# Patient Record
Sex: MALE | Race: WHITE | NOT HISPANIC OR LATINO | URBAN - METROPOLITAN AREA
[De-identification: names, ages, dates, MRNs, and addresses within clinical notes are randomized per-mention and may not be internally consistent; named-entity substitution may affect disease eponyms.]

---

## 2018-05-16 ENCOUNTER — EMERGENCY (EMERGENCY)
Facility: HOSPITAL | Age: 63
LOS: 1 days | Discharge: HOME | End: 2018-05-16
Attending: EMERGENCY MEDICINE | Admitting: EMERGENCY MEDICINE
Payer: COMMERCIAL

## 2018-05-16 VITALS
DIASTOLIC BLOOD PRESSURE: 73 MMHG | OXYGEN SATURATION: 97 % | HEART RATE: 85 BPM | RESPIRATION RATE: 16 BRPM | WEIGHT: 164.91 LBS | SYSTOLIC BLOOD PRESSURE: 111 MMHG | TEMPERATURE: 98 F

## 2018-05-16 VITALS
OXYGEN SATURATION: 98 % | SYSTOLIC BLOOD PRESSURE: 101 MMHG | DIASTOLIC BLOOD PRESSURE: 69 MMHG | TEMPERATURE: 98 F | RESPIRATION RATE: 16 BRPM

## 2018-05-16 DIAGNOSIS — R33.9 RETENTION OF URINE, UNSPECIFIED: ICD-10-CM

## 2018-05-16 DIAGNOSIS — R10.30 LOWER ABDOMINAL PAIN, UNSPECIFIED: ICD-10-CM

## 2018-05-16 LAB
ALBUMIN SERPL ELPH-MCNC: 4.2 G/DL — SIGNIFICANT CHANGE UP (ref 3.3–5)
ALP SERPL-CCNC: 75 U/L — SIGNIFICANT CHANGE UP (ref 40–120)
ALT FLD-CCNC: 18 U/L — SIGNIFICANT CHANGE UP (ref 10–45)
ANION GAP SERPL CALC-SCNC: 17 MMOL/L — SIGNIFICANT CHANGE UP (ref 5–17)
APPEARANCE UR: CLEAR — SIGNIFICANT CHANGE UP
AST SERPL-CCNC: 29 U/L — SIGNIFICANT CHANGE UP (ref 10–40)
BASOPHILS NFR BLD AUTO: 0.3 % — SIGNIFICANT CHANGE UP (ref 0–2)
BILIRUB SERPL-MCNC: 2.2 MG/DL — HIGH (ref 0.2–1.2)
BILIRUB UR-MCNC: NEGATIVE — SIGNIFICANT CHANGE UP
BUN SERPL-MCNC: 13 MG/DL — SIGNIFICANT CHANGE UP (ref 7–23)
CALCIUM SERPL-MCNC: 10.1 MG/DL — SIGNIFICANT CHANGE UP (ref 8.4–10.5)
CHLORIDE SERPL-SCNC: 104 MMOL/L — SIGNIFICANT CHANGE UP (ref 96–108)
CO2 SERPL-SCNC: 24 MMOL/L — SIGNIFICANT CHANGE UP (ref 22–31)
COLOR SPEC: YELLOW — SIGNIFICANT CHANGE UP
CREAT SERPL-MCNC: 1.05 MG/DL — SIGNIFICANT CHANGE UP (ref 0.5–1.3)
DIFF PNL FLD: (no result)
EOSINOPHIL NFR BLD AUTO: 2.2 % — SIGNIFICANT CHANGE UP (ref 0–6)
GLUCOSE SERPL-MCNC: 125 MG/DL — HIGH (ref 70–99)
GLUCOSE UR QL: NEGATIVE — SIGNIFICANT CHANGE UP
HCT VFR BLD CALC: 42.7 % — SIGNIFICANT CHANGE UP (ref 39–50)
HGB BLD-MCNC: 14 G/DL — SIGNIFICANT CHANGE UP (ref 13–17)
KETONES UR-MCNC: NEGATIVE — SIGNIFICANT CHANGE UP
LEUKOCYTE ESTERASE UR-ACNC: (no result)
LYMPHOCYTES # BLD AUTO: 28 % — SIGNIFICANT CHANGE UP (ref 13–44)
MCHC RBC-ENTMCNC: 28.5 PG — SIGNIFICANT CHANGE UP (ref 27–34)
MCHC RBC-ENTMCNC: 32.8 G/DL — SIGNIFICANT CHANGE UP (ref 32–36)
MCV RBC AUTO: 86.8 FL — SIGNIFICANT CHANGE UP (ref 80–100)
MONOCYTES NFR BLD AUTO: 9.2 % — SIGNIFICANT CHANGE UP (ref 2–14)
NEUTROPHILS NFR BLD AUTO: 60.3 % — SIGNIFICANT CHANGE UP (ref 43–77)
NITRITE UR-MCNC: NEGATIVE — SIGNIFICANT CHANGE UP
PH UR: 7 — SIGNIFICANT CHANGE UP (ref 5–8)
PLATELET # BLD AUTO: 247 K/UL — SIGNIFICANT CHANGE UP (ref 150–400)
POTASSIUM SERPL-MCNC: 4.5 MMOL/L — SIGNIFICANT CHANGE UP (ref 3.5–5.3)
POTASSIUM SERPL-SCNC: 4.5 MMOL/L — SIGNIFICANT CHANGE UP (ref 3.5–5.3)
PROT SERPL-MCNC: 7.1 G/DL — SIGNIFICANT CHANGE UP (ref 6–8.3)
PROT UR-MCNC: NEGATIVE MG/DL — SIGNIFICANT CHANGE UP
RBC # BLD: 4.92 M/UL — SIGNIFICANT CHANGE UP (ref 4.2–5.8)
RBC # FLD: 14.2 % — SIGNIFICANT CHANGE UP (ref 10.3–16.9)
SODIUM SERPL-SCNC: 145 MMOL/L — SIGNIFICANT CHANGE UP (ref 135–145)
SP GR SPEC: <=1.005 — SIGNIFICANT CHANGE UP (ref 1–1.03)
UROBILINOGEN FLD QL: 0.2 E.U./DL — SIGNIFICANT CHANGE UP
WBC # BLD: 6.7 K/UL — SIGNIFICANT CHANGE UP (ref 3.8–10.5)
WBC # FLD AUTO: 6.7 K/UL — SIGNIFICANT CHANGE UP (ref 3.8–10.5)

## 2018-05-16 PROCEDURE — 80053 COMPREHEN METABOLIC PANEL: CPT

## 2018-05-16 PROCEDURE — 81001 URINALYSIS AUTO W/SCOPE: CPT

## 2018-05-16 PROCEDURE — 87086 URINE CULTURE/COLONY COUNT: CPT

## 2018-05-16 PROCEDURE — 99284 EMERGENCY DEPT VISIT MOD MDM: CPT | Mod: 25

## 2018-05-16 PROCEDURE — 85025 COMPLETE CBC W/AUTO DIFF WBC: CPT

## 2018-05-16 PROCEDURE — 36415 COLL VENOUS BLD VENIPUNCTURE: CPT

## 2018-05-16 PROCEDURE — 51702 INSERT TEMP BLADDER CATH: CPT

## 2018-05-16 RX ORDER — LIDOCAINE HCL 20 MG/ML
5 VIAL (ML) INJECTION ONCE
Qty: 0 | Refills: 0 | Status: COMPLETED | OUTPATIENT
Start: 2018-05-16 | End: 2018-05-16

## 2018-05-16 RX ADMIN — Medication 5 MILLILITER(S): at 04:13

## 2018-05-16 NOTE — ED ADULT NURSE REASSESSMENT NOTE - NS ED NURSE REASSESS COMMENT FT1
Urology successfully placed 20F lozada cath drained 900ml clear yellow urine pt verbalized relief from pressure

## 2018-05-16 NOTE — ED PROVIDER NOTE - OBJECTIVE STATEMENT
61 y/o m hx BPH presents stating 3 days ago, had difficulty urinating, was seen by his urologist and had lozada placed.  Pt stating he was told to take it out last night and f/u with his urologist today for cystoscopy.  Pt stating after he removed it, there was blood coming out of urethra and since then he has been unable to urinate, having lower abdominal pain.  Denies fever, chills, all other ROS negative.

## 2018-05-16 NOTE — ED ADULT NURSE NOTE - CHIEF COMPLAINT QUOTE
having urinary retention since my catheter was removed tonight; I noticed there's a lot of blood in my catheter

## 2018-05-16 NOTE — ED ADULT NURSE NOTE - OBJECTIVE STATEMENT
pt. presented with c/o urinary retention, pt. states he had urinary retention on sunday, monday he saw his urologist and was discharged with lozada cath and instructions to remove it on tuesday, pt. states he removed it around 10pm yesterday and did not have any urinary output since then, pt. is A&Ox3, communicates w/o difficulty, restless, c/o pain to lower abdomen. unable to insert lozada cath, resistance was met and blood clots to cath tip were noted on removal, urology called, awaiting consult.

## 2018-05-16 NOTE — ED PROVIDER NOTE - MEDICAL DECISION MAKING DETAILS
61 y/o m urinary retention; had difficulty placing lozada in ED, urology came and placed 20F cudet catheter, draining yellow urine, no clots, labs wnl.  U/a pending and will dispo

## 2018-05-16 NOTE — ED ADULT NURSE NOTE - NS ED NURSE LEVEL OF CONSCIOUSNESS ORIENTATION
Controlled, /72 this am  - c/w amlodipine 5mg daily  - c/w losartan 50mg daily  - cont to monitor BP Oriented - self; Oriented - place; Oriented - time

## 2018-05-16 NOTE — ED PROVIDER NOTE - ATTENDING CONTRIBUTION TO CARE
Attending Statement: I have personally performed a face to face diagnostic evaluation on this patient. I have reviewed the ACP note and agree with the history, exam and plan of care, except as noted.     Attending Contribution to Care:  61 y/o m urinary retention; had difficulty placing lozada in ED, urology came and placed 20F cudet catheter, draining yellow urine, no clots, labs wnl. Will hold off on abx pending UCx - pt given return precautions.  The patient is stable for DC. They were advised to call their PMD for prompt outpatient follow up. Return precautions were discussed. The patient was advised to return to the ER for any concerning or worsening symptoms.

## 2018-05-16 NOTE — ED ADULT NURSE NOTE - CHPI ED SYMPTOMS NEG
no burning urination/no chills/no blood in stool/no hematuria/no nausea/no vomiting/no dysuria/no fever/no diarrhea/no abdominal distension

## 2018-05-18 LAB
CULTURE RESULTS: NO GROWTH — SIGNIFICANT CHANGE UP
SPECIMEN SOURCE: SIGNIFICANT CHANGE UP

## 2019-04-08 ENCOUNTER — APPOINTMENT (OUTPATIENT)
Dept: HEART AND VASCULAR | Facility: CLINIC | Age: 64
End: 2019-04-08
Payer: COMMERCIAL

## 2019-04-08 VITALS
OXYGEN SATURATION: 95 % | HEART RATE: 72 BPM | WEIGHT: 166 LBS | RESPIRATION RATE: 12 BRPM | DIASTOLIC BLOOD PRESSURE: 58 MMHG | BODY MASS INDEX: 22 KG/M2 | HEIGHT: 73 IN | TEMPERATURE: 98.7 F | SYSTOLIC BLOOD PRESSURE: 80 MMHG

## 2019-04-08 DIAGNOSIS — I48.92 UNSPECIFIED ATRIAL FLUTTER: ICD-10-CM

## 2019-04-08 PROCEDURE — 99204 OFFICE O/P NEW MOD 45 MIN: CPT | Mod: 25

## 2019-04-08 PROCEDURE — 93015 CV STRESS TEST SUPVJ I&R: CPT

## 2019-04-08 RX ORDER — LECITHIN 1200 MG
1200 CAPSULE ORAL
Refills: 0 | Status: ACTIVE | COMMUNITY

## 2019-04-08 NOTE — PHYSICAL EXAM
[General Appearance - Well Developed] : well developed [Normal Appearance] : normal appearance [Well Groomed] : well groomed [General Appearance - Well Nourished] : well nourished [No Deformities] : no deformities [General Appearance - In No Acute Distress] : no acute distress [Normal Conjunctiva] : the conjunctiva exhibited no abnormalities [Eyelids - No Xanthelasma] : the eyelids demonstrated no xanthelasmas [Normal Oral Mucosa] : normal oral mucosa [No Oral Pallor] : no oral pallor [No Oral Cyanosis] : no oral cyanosis [Normal Jugular Venous A Waves Present] : normal jugular venous A waves present [Normal Jugular Venous V Waves Present] : normal jugular venous V waves present [No Jugular Venous Ring A Waves] : no jugular venous ring A waves [Respiration, Rhythm And Depth] : normal respiratory rhythm and effort [Exaggerated Use Of Accessory Muscles For Inspiration] : no accessory muscle use [Auscultation Breath Sounds / Voice Sounds] : lungs were clear to auscultation bilaterally [Heart Rate And Rhythm] : heart rate and rhythm were normal [Heart Sounds] : normal S1 and S2 [Murmurs] : no murmurs present [Abdomen Soft] : soft [Abdomen Tenderness] : non-tender [Abdomen Mass (___ Cm)] : no abdominal mass palpated [Abnormal Walk] : normal gait [Gait - Sufficient For Exercise Testing] : the gait was sufficient for exercise testing [Nail Clubbing] : no clubbing of the fingernails [Cyanosis, Localized] : no localized cyanosis [Petechial Hemorrhages (___cm)] : no petechial hemorrhages [] : no ischemic changes [Oriented To Time, Place, And Person] : oriented to person, place, and time [Affect] : the affect was normal [Mood] : the mood was normal [No Anxiety] : not feeling anxious

## 2019-04-08 NOTE — REVIEW OF SYSTEMS
[see HPI] : see HPI [Joint Pain] : joint pain [Limb Weakness (Paresis)] : limb weakness [Numbness (Hypesthesia)] : numbness [Tingling (Paresthesia)] : tingling [Negative] : Heme/Lymph

## 2019-04-08 NOTE — REASON FOR VISIT
[Initial Evaluation] : an initial evaluation of [Atrial Fibrillation] : atrial fibrillation [Dyspnea] : dyspnea

## 2021-09-16 ENCOUNTER — APPOINTMENT (OUTPATIENT)
Dept: HEART AND VASCULAR | Facility: CLINIC | Age: 66
End: 2021-09-16
Payer: COMMERCIAL

## 2021-09-16 ENCOUNTER — APPOINTMENT (OUTPATIENT)
Dept: HEART AND VASCULAR | Facility: CLINIC | Age: 66
End: 2021-09-16

## 2021-09-16 VITALS
OXYGEN SATURATION: 95 % | HEIGHT: 73 IN | DIASTOLIC BLOOD PRESSURE: 62 MMHG | BODY MASS INDEX: 18.02 KG/M2 | HEART RATE: 81 BPM | WEIGHT: 136 LBS | SYSTOLIC BLOOD PRESSURE: 90 MMHG | RESPIRATION RATE: 12 BRPM | TEMPERATURE: 98 F

## 2021-09-16 DIAGNOSIS — R06.02 SHORTNESS OF BREATH: ICD-10-CM

## 2021-09-16 PROCEDURE — 93306 TTE W/DOPPLER COMPLETE: CPT

## 2021-09-16 PROCEDURE — 93000 ELECTROCARDIOGRAM COMPLETE: CPT | Mod: NC

## 2021-09-16 PROCEDURE — 99214 OFFICE O/P EST MOD 30 MIN: CPT | Mod: 25

## 2021-09-16 NOTE — DISCUSSION/SUMMARY
[FreeTextEntry1] : stable exam, normal LVEF by echo, no cardiac contra indication to planned procedure

## 2021-09-16 NOTE — REVIEW OF SYSTEMS
[Sinus Pressure] : sinus pressure [SOB] : shortness of breath [Joint Pain] : joint pain [Negative] : Heme/Lymph

## 2024-03-27 ENCOUNTER — APPOINTMENT (OUTPATIENT)
Dept: UROLOGY | Facility: CLINIC | Age: 69
End: 2024-03-27
Payer: MEDICARE

## 2024-03-27 VITALS
RESPIRATION RATE: 15 BRPM | WEIGHT: 136 LBS | HEART RATE: 65 BPM | DIASTOLIC BLOOD PRESSURE: 67 MMHG | BODY MASS INDEX: 18.02 KG/M2 | HEIGHT: 73 IN | OXYGEN SATURATION: 100 % | SYSTOLIC BLOOD PRESSURE: 109 MMHG | TEMPERATURE: 97.9 F

## 2024-03-27 DIAGNOSIS — K76.9 LIVER DISEASE, UNSPECIFIED: ICD-10-CM

## 2024-03-27 PROCEDURE — 99205 OFFICE O/P NEW HI 60 MIN: CPT

## 2024-03-27 PROCEDURE — G2211 COMPLEX E/M VISIT ADD ON: CPT

## 2024-03-27 NOTE — HISTORY OF PRESENT ILLNESS
[FreeTextEntry1] : GIRMA SAEED May 23 1955   Language: English Date of First visit: 2024 Accompanied by: self Contact info: Referring Provider/PCP: Dr. omalley Fax:   CC/ Problem List:  gross hematuria =============================================================================== FIRST VISIT / Summary: Very pleasant 68 year old M here for gross hematuria. Since  has gross hematuria. Seen by previous urologist where workup was done and was told has severe urinary infection and was treated with levofloxacin x 5 days and hematuria resolved. Has been taking levofloxacin intermittently when he notices blood clots. last took levofloxacin 3/26/24 Drinks about 36 oz water daily. Intermittently takes flomax when he feels urine flow weak but dislikes taking this medication because when he takes "pees like a race horse"  Wife recently passed 2023 from cancer   Requested valium or similar if needs office cysto  IPSS 25 QOL 4 most bothered by frequency ------------------------------------------------------------------------------------------- INTERVAL VISITS:  ===============================================================================   PMH: muscular dystrophy Meds: baby aspirin 81 mg, metoprolol, lecithin, magnesium, prostate herbal supplement, entacavir, multivitamins, stool softener All: denies  FHx: father  lung cancer  SocHx: former smoker quit  smoked 1ppd, wife passed away,    PSH: prostate surgery x2 2011 and 2018   ROS: Review of Systems is as per HPI unless otherwise denoted below   =============================================================================== DATA: LABS (SELECTED):---------------------------------------------------------------------------------------------------     RADS:-------------------------------------------------------------------------------------------------------------------     PATHOLOGY/CYTOLOGY:-------------------------------------------------------------------------------------------     VOIDING STUDIES: ----------------------------------------------------------------------------------------------------  2024 PVR 0   STONE STUDIES: (Analysis/LLSA)----------------------------------------------------------------------------------     PROCEDURES: -----------------------------------------------------------------------------------------------       =============================================================================== PHYSICAL EXAM:    FOCUSED: ----------------------------------------------------------------------------------------------------------------  Date: 2024 Chaperone: offered and patient declined   Penis: No lesions, tenderness, curvature, plaques. Meatus: normal caliber Testes: Descended bilaterally. Non tender, no palpable masses Epididymi: No palpable epididymal masses Scrotum: Scrotal wall normal. No spermatic cord mass. No palpable hydroceles   Date: 2024  Chaperone: offered and patient declined  Prostate: Abnormal: enlarged approx >50g, nontender, firm, rounded no discrete nodule. Normal lateral contour bilaterally.      ======================================================================================= DISCUSSION: ======================================================================================= ASSESSMENT and PLAN   1. Gross hematuria - UA/UCx/cytology on friday after he is off antibiotics for several days - CTU - cystoscopy (if bx needed, will do under anesthesia)  2. LUTs - Will see if open when cysto done  3. epididymal mass - US scrotum   =======================================================================================  The total time personally spent preparing for this visit (reviewing test results, obtaining external history) and during the visit (ordering tests/medications, spent face to face with the patient / family and counseling them on the above), as well as after the visit (on clinical documentation and coordination with other care providers) was approximately 80 minutes.   Thank you for allowing me to assist in the care of your patient. Should you have any questions please do not hesitate to reach out to me.     Jayme Barragan MD                                                         [facility] Physician [facility]   [place] Office:  [facility], [address] [place] [address] T: [phone] F: [phone]   [place] Office:   [place], [address] [place] [address] T: [phone] F: [phone]

## 2024-03-28 LAB
ANION GAP SERPL CALC-SCNC: 12 MMOL/L
BASOPHILS # BLD AUTO: 0.06 K/UL
BASOPHILS NFR BLD AUTO: 0.7 %
BUN SERPL-MCNC: 21 MG/DL
CALCIUM SERPL-MCNC: 9.8 MG/DL
CHLORIDE SERPL-SCNC: 104 MMOL/L
CO2 SERPL-SCNC: 26 MMOL/L
CREAT SERPL-MCNC: 1.16 MG/DL
EGFR: 68 ML/MIN/1.73M2
EOSINOPHIL # BLD AUTO: 0.1 K/UL
EOSINOPHIL NFR BLD AUTO: 1.1 %
ESTIMATED AVERAGE GLUCOSE: 105 MG/DL
GLUCOSE SERPL-MCNC: 100 MG/DL
HBA1C MFR BLD HPLC: 5.3 %
HCT VFR BLD CALC: 42.3 %
HGB BLD-MCNC: 13.8 G/DL
IMM GRANULOCYTES NFR BLD AUTO: 0.2 %
LYMPHOCYTES # BLD AUTO: 1.68 K/UL
LYMPHOCYTES NFR BLD AUTO: 18.9 %
MAN DIFF?: NORMAL
MCHC RBC-ENTMCNC: 28.6 PG
MCHC RBC-ENTMCNC: 32.6 GM/DL
MCV RBC AUTO: 87.6 FL
MONOCYTES # BLD AUTO: 0.63 K/UL
MONOCYTES NFR BLD AUTO: 7.1 %
NEUTROPHILS # BLD AUTO: 6.41 K/UL
NEUTROPHILS NFR BLD AUTO: 72 %
PLATELET # BLD AUTO: 231 K/UL
POTASSIUM SERPL-SCNC: 4.3 MMOL/L
PSA FREE FLD-MCNC: 33 %
PSA FREE SERPL-MCNC: 4.55 NG/ML
PSA SERPL-MCNC: 13.6 NG/ML
RBC # BLD: 4.83 M/UL
RBC # FLD: 15.6 %
SODIUM SERPL-SCNC: 142 MMOL/L
WBC # FLD AUTO: 8.9 K/UL

## 2024-04-08 ENCOUNTER — APPOINTMENT (OUTPATIENT)
Dept: UROLOGY | Facility: CLINIC | Age: 69
End: 2024-04-08
Payer: MEDICARE

## 2024-04-08 PROCEDURE — A4216: CPT | Mod: NC

## 2024-04-08 PROCEDURE — 99214 OFFICE O/P EST MOD 30 MIN: CPT | Mod: 25

## 2024-04-08 PROCEDURE — 51700 IRRIGATION OF BLADDER: CPT

## 2024-04-09 NOTE — HISTORY OF PRESENT ILLNESS
[FreeTextEntry1] : GIRMA SAEED May 23 1955  Language: English Date of First visit: 2024 Accompanied by: self Contact info: Referring Provider/PCP: Dr. omalley Fax:  CC/ Problem List: gross hematuria =============================================================================== FIRST VISIT / Summary: Very pleasant 68 year old M here for gross hematuria. Since 2019 has gross hematuria. Seen by previous urologist where workup was done and was told has severe urinary infection and was treated with levofloxacin x 5 days and hematuria resolved. Has been taking levofloxacin intermittently when he notices blood clots. last took levofloxacin 3/26/24 Drinks about 36 oz water daily. Intermittently takes flomax when he feels urine flow weak but dislikes taking this medication because when he takes "pees like a race horse"  Wife recently passed 2023 from cancer  Requested valium or similar if needs office cysto  IPSS 25 QOL 4 most bothered by frequency ------------------------------------------------------------------------------------------- INTERVAL VISITS: The patient's medications and allergies were reviewed and edited below. Dated  2024  Unable to void and presented at [facility] where lozada catheter was placed and presents to office for TOV. he started back on tamsulosin yesterday. Cath removed but ultimately requested catheter be replaced with 231cc in bladder  ===============================================================================  PMH: muscular dystrophy Meds: baby aspirin 81 mg, metoprolol, lecithin, magnesium, prostate herbal supplement, entacavir, multivitamins, stool softener All: denies FHx: father  lung cancer SocHx: former smoker quit  smoked 1ppd, wife passed away,  PSH: prostate surgery x2  and 2018  ROS: Review of Systems is as per HPI unless otherwise denoted below  =============================================================================== DATA: LABS (SELECTED):---------------------------------------------------------------------------------------------------   RADS:-------------------------------------------------------------------------------------------------------------------   PATHOLOGY/CYTOLOGY:-------------------------------------------------------------------------------------------   VOIDING STUDIES: ---------------------------------------------------------------------------------------------------- 2024 PVR 0  2024 Fill/Pull: filled with 200cc water, catheter removed in its entirety. smal void , drank 2 cups water, small void 2nd . 16 [place] citizen Coude catheter inserted, 250cc clear urine drained, no clots noted   STONE STUDIES: (Analysis/LLSA)----------------------------------------------------------------------------------   PROCEDURES: -----------------------------------------------------------------------------------------------    =============================================================================== PHYSICAL EXAM:   FOCUSED: ---------------------------------------------------------------------------------------------------------------- Date: 2024 Chaperone: offered and patient declined  Penis: No lesions, tenderness, curvature, plaques. Meatus: normal caliber Testes: Descended bilaterally. Non tender, no palpable masses Epididymi: No palpable epididymal masses Scrotum: Scrotal wall normal. No spermatic cord mass. No palpable hydroceles  Date: 2024 Chaperone: offered and patient declined  Prostate: Abnormal: enlarged approx >50g, nontender, firm, rounded no discrete nodule. Normal lateral contour bilaterally.  ======================================================================================= DISCUSSION: ======================================================================================= ASSESSMENT and PLAN  1. Gross hematuria - UA negative from ER /UCx pending - CTU - cystoscopy (if bx needed, will do under anesthesia)  2. Elevated PSA - MRI  3. epididymal mass - US scrotum  4. Urinary retention -16 Fr Coude catheter reinserted -prescribed Flomax 0.4 mg nightly, reviewed SE (he will monitor his BP with his BP machine) -f/u in 1 week   ======================================================================================= The total time personally spent preparing for this visit (reviewing test results, obtaining external history) and during the visit (ordering tests/medications, spent face to face with the patient / family and counseling them on the above), as well as after the visit (on clinical documentation and coordination with other care providers) was approximately 35 minutes plus procedure  Thank you for allowing me to assist in the care of your patient. Should you have any questions please do not hesitate to reach out to me.   Jayme Barragan MD       [facility] Physician [facility]  [place] Office:  [facility], [address] [place] [address] T: [phone] F: [phone]  [place] Office:  [address] [place] [address], [address]st [place] [address] T: [phone] F: [phone]

## 2024-04-15 ENCOUNTER — APPOINTMENT (OUTPATIENT)
Dept: UROLOGY | Facility: CLINIC | Age: 69
End: 2024-04-15
Payer: MEDICARE

## 2024-04-15 VITALS
HEART RATE: 79 BPM | BODY MASS INDEX: 18.02 KG/M2 | OXYGEN SATURATION: 96 % | DIASTOLIC BLOOD PRESSURE: 67 MMHG | SYSTOLIC BLOOD PRESSURE: 105 MMHG | HEIGHT: 73 IN | RESPIRATION RATE: 15 BRPM | WEIGHT: 136 LBS | TEMPERATURE: 98.5 F

## 2024-04-15 PROCEDURE — 51700 IRRIGATION OF BLADDER: CPT

## 2024-04-15 PROCEDURE — A4216: CPT | Mod: NC

## 2024-04-15 PROCEDURE — 99215 OFFICE O/P EST HI 40 MIN: CPT | Mod: 25

## 2024-04-15 NOTE — HISTORY OF PRESENT ILLNESS
[FreeTextEntry1] : GIRMA SAEED May 23 1955  Language: English Date of First visit: 2024 Accompanied by: self Contact info: Referring Provider/PCP: Dr. omalley Fax:  CC/ Problem List: gross hematuria =============================================================================== FIRST VISIT / Summary: Very pleasant 68 year old M here for gross hematuria. Since  has gross hematuria. Seen by previous urologist where workup was done and was told has severe urinary infection and was treated with levofloxacin x 5 days and hematuria resolved. Has been taking levofloxacin intermittently when he notices blood clots. last took levofloxacin 3/26/24 Drinks about 36 oz water daily. Intermittently takes flomax when he feels urine flow weak but dislikes taking this medication because when he takes "pees like a race horse"  Wife recently passed 2023 from cancer  Requested valium or similar if needs office cysto  IPSS 25 QOL 4 most bothered by frequency Unable to void and presented at [facility] where lozada catheter was placed and presents to office for TOV. he started back on tamsulosin yesterday. Cath removed but ultimately requested catheter be replaced with 231cc in bladder ------------------------------------------------------------------------------------------- INTERVAL VISITS: The patient's medications and allergies were reviewed and edited below. Dated 04/15/2024   CT done. Here for TOV. Needs MRI  ===============================================================================  PMH: muscular dystrophy Meds: baby aspirin 81 mg, metoprolol, lecithin, magnesium, prostate herbal supplement, entacavir, multivitamins, stool softener All: denies FHx: father  lung cancer SocHx: former smoker quit  smoked 1ppd, wife passed away,  PSH: prostate surgery x2  and 2018  ROS: Review of Systems is as per HPI unless otherwise denoted below  =============================================================================== DATA: LABS (SELECTED):---------------------------------------------------------------------------------------------------   RADS:------------------------------------------------------------------------------------------------------------------- 2024: CT urogram: exophytic rigth renal calcified nodule, stable, otherwise normal kidneys and ureters. Prostate 132cc 7.7x6.5x5  PATHOLOGY/CYTOLOGY:-------------------------------------------------------------------------------------------   VOIDING STUDIES: ---------------------------------------------------------------------------------------------------- 2024 PVR 0 2024 Fill/Pull: filled with 200cc water, catheter removed in its entirety. smal void , drank 2 cups water, small void 2nd . 16 [place] Coude catheter inserted, 250cc clear urine drained, no clots noted 04/15/2024 Instilled 350 voided 180 comfortable.   STONE STUDIES: (Analysis/LLSA)----------------------------------------------------------------------------------   PROCEDURES: -----------------------------------------------------------------------------------------------    =============================================================================== PHYSICAL EXAM:   FOCUSED: ---------------------------------------------------------------------------------------------------------------- Date: 2024 Chaperone: offered and patient declined  Penis: No lesions, tenderness, curvature, plaques. Meatus: normal caliber Testes: Descended bilaterally. Non tender, no palpable masses Epididymi: No palpable epididymal masses Scrotum: Scrotal wall normal. No spermatic cord mass. No palpable hydroceles  Date: 2024 Chaperone: offered and patient declined  Prostate: Abnormal: enlarged approx >50g, nontender, firm, rounded no discrete nodule. Normal lateral contour bilaterally.  ======================================================================================= DISCUSSION: ======================================================================================= ASSESSMENT and PLAN  1. Gross hematuria - UA negative from ER /UCx pending - CTU - cystoscopy (if bx needed, will do under anesthesia)  2. Elevated PSA 13 - MRI  - may be due to large size only, PSAD 0.1  3. epididymal mass - US scrotum  4. Urinary retention -passed TOV -prescribed Flomax 0.4 mg nightly, passed TOV today  ======================================================================================= The total time personally spent preparing for this visit (reviewing test results, obtaining external history) and during the visit (ordering tests/medications, spent face to face with the patient / family and counseling them on the above), as well as after the visit (on clinical documentation and coordination with other care providers) was approximately 45 minutes plus procedure  The patient's imaging study prior to this visit was personally reviewed and the above is my independent interpretation for the  organ systems.   Thank you for allowing me to assist in the care of your patient. Should you have any questions please do not hesitate to reach out to me.   Jayme Barragan MD [facility] Physician [facility]  [place] Office:  [facility], [address] [place] [address] T: [phone] F: [phone]  [place] Office:  [address] [place] [address], [address] [place] [address] T: [phone] F: [phone]

## 2024-04-19 ENCOUNTER — APPOINTMENT (OUTPATIENT)
Dept: UROLOGY | Facility: CLINIC | Age: 69
End: 2024-04-19
Payer: MEDICARE

## 2024-04-19 VITALS
OXYGEN SATURATION: 95 % | DIASTOLIC BLOOD PRESSURE: 56 MMHG | SYSTOLIC BLOOD PRESSURE: 84 MMHG | RESPIRATION RATE: 14 BRPM | TEMPERATURE: 98.1 F | HEART RATE: 73 BPM

## 2024-04-19 DIAGNOSIS — R97.20 ELEVATED PROSTATE, SPECIFIC ANTIGEN [PSA]: ICD-10-CM

## 2024-04-19 DIAGNOSIS — N50.89 OTHER SPECIFIED DISORDERS OF THE MALE GENITAL ORGANS: ICD-10-CM

## 2024-04-19 PROCEDURE — G2211 COMPLEX E/M VISIT ADD ON: CPT

## 2024-04-19 PROCEDURE — 99213 OFFICE O/P EST LOW 20 MIN: CPT

## 2024-04-29 ENCOUNTER — APPOINTMENT (OUTPATIENT)
Dept: ULTRASOUND IMAGING | Facility: CLINIC | Age: 69
End: 2024-04-29
Payer: MEDICARE

## 2024-04-29 ENCOUNTER — APPOINTMENT (OUTPATIENT)
Dept: MRI IMAGING | Facility: CLINIC | Age: 69
End: 2024-04-29
Payer: MEDICARE

## 2024-04-29 ENCOUNTER — RESULT REVIEW (OUTPATIENT)
Age: 69
End: 2024-04-29

## 2024-04-29 PROCEDURE — 72197 MRI PELVIS W/O & W/DYE: CPT | Mod: TC

## 2024-04-29 PROCEDURE — 76498P: CUSTOM

## 2024-04-29 PROCEDURE — 93975 VASCULAR STUDY: CPT

## 2024-04-29 PROCEDURE — 70360 X-RAY EXAM OF NECK: CPT

## 2024-04-29 PROCEDURE — A9585: CPT

## 2024-04-29 PROCEDURE — A9585: CPT | Mod: JW

## 2024-05-06 ENCOUNTER — APPOINTMENT (OUTPATIENT)
Dept: UROLOGY | Facility: CLINIC | Age: 69
End: 2024-05-06
Payer: MEDICARE

## 2024-05-06 VITALS
BODY MASS INDEX: 18.02 KG/M2 | DIASTOLIC BLOOD PRESSURE: 66 MMHG | OXYGEN SATURATION: 78 % | HEIGHT: 73 IN | SYSTOLIC BLOOD PRESSURE: 101 MMHG | HEART RATE: 72 BPM | WEIGHT: 136 LBS | TEMPERATURE: 97.8 F | RESPIRATION RATE: 14 BRPM

## 2024-05-06 PROCEDURE — 99215 OFFICE O/P EST HI 40 MIN: CPT | Mod: 25

## 2024-05-06 PROCEDURE — 52000 CYSTOURETHROSCOPY: CPT

## 2024-05-08 LAB — BACTERIA UR CULT: NORMAL

## 2024-05-08 NOTE — HISTORY OF PRESENT ILLNESS
[FreeTextEntry1] : GIRMA SAEED May 23 1955  Language: English Date of First visit: 2024 Accompanied by: self Contact info: Referring Provider/PCP: Dr. omalley Fax:  CC/ Problem List: gross hematuria =============================================================================== FIRST VISIT / Summary: Very pleasant 68 year old M here for gross hematuria. Since  has gross hematuria. Seen by previous urologist where workup was done and was told has severe urinary infection and was treated with levofloxacin x 5 days and hematuria resolved. Has been taking levofloxacin intermittently when he notices blood clots. last took levofloxacin 3/26/24 Drinks about 36 oz water daily. Intermittently takes flomax when he feels urine flow weak but dislikes taking this medication because when he takes "pees like a race horse"  Wife recently passed 2023 from cancer  Requested valium or similar if needs office cysto  IPSS 25 QOL 4 most bothered by frequency Unable to void and presented at [facility] where lozada catheter was placed and presents to office for TOV. he started back on tamsulosin yesterday. Cath removed but ultimately requested catheter be replaced with 231cc in bladder -------------------------------------------------------------------------------------------  INTERVAL VISITS: The patient's medications and allergies were reviewed and edited below. Dated 2024   Discussed MRI and cystoscopy findings in detail. Discussed with Dr. Pereira  ===============================================================================  PMH: muscular dystrophy Meds: baby aspirin 81 mg, metoprolol, lecithin, magnesium, prostate herbal supplement, entacavir, multivitamins, stool softener All: denies FHx: father  lung cancer SocHx: former smoker quit  smoked 1ppd, wife passed away,  PSH: prostate surgery x2  and   ROS: Review of Systems is as per HPI unless otherwise denoted below  =============================================================================== DATA: LABS (SELECTED):---------------------------------------------------------------------------------------------------   RADS:------------------------------------------------------------------------------------------------------------------- 2024: CT urogram: exophytic rigth renal calcified nodule, stable, otherwise normal kidneys and ureters. Prostate 132cc 7.7x6.5x5 2024: MRI prostate 119cc volume, PSAD 0.11 PIRADS 2  PATHOLOGY/CYTOLOGY:-------------------------------------------------------------------------------------------   VOIDING STUDIES: ---------------------------------------------------------------------------------------------------- 2024 PVR 0 2024 Fill/Pull: filled with 200cc water, catheter removed in its entirety. smal void , drank 2 cups water, small void 2nd . 16 [place] Coude catheter inserted, 250cc clear urine drained, no clots noted 04/15/2024 Instilled 350 voided 180 comfortable.   STONE STUDIES: (Analysis/LLSA)----------------------------------------------------------------------------------   PROCEDURES: -----------------------------------------------------------------------------------------------    =============================================================================== PHYSICAL EXAM:   FOCUSED: ---------------------------------------------------------------------------------------------------------------- Date: 2024 Chaperone: offered and patient declined  Penis: No lesions, tenderness, curvature, plaques. Meatus: normal caliber Testes: Descended bilaterally. Non tender, no palpable masses Epididymi: No palpable epididymal masses Scrotum: Scrotal wall normal. No spermatic cord mass. No palpable hydroceles  Date: 2024 Chaperone: offered and patient declined  Prostate: Abnormal: enlarged approx >50g, nontender, firm, rounded no discrete nodule. Normal lateral contour bilaterally.  ======================================================================================= DISCUSSION: ======================================================================================= ASSESSMENT and PLAN  1. Gross hematuria - UA negative from ER /UCx pending - CTU - cystoscopy (if bx needed, will do under anesthesia)  2. Elevated PSA 13 - MRI no lesions - may be due to large size only, PSAD 0.11 - he prefers outlet procedure knowing some chance prostate cancer could be found on pathology  3. 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 Urinary retention -lozada was replaced after cysto 2024  -prescribed Flomax 0.4 mg -recommend HoLEP  5. Bladder tumor - discussed with Dr. Pereira - will consider combined HoLEP and TURBT for diagnosis  ======================================================================================= The total time personally spent preparing for this visit (reviewing test results, obtaining external history) and during the visit (ordering tests/medications, spent face to face with the patient / family and counseling them on the above), as well as after the visit (on clinical documentation and coordination with other care providers) was approximately 45 minutes plus procedure  Thank you for allowing me to assist in the care of your patient. Should you have any questions please do not hesitate to reach out to me.   Jayme Barragan MD [facility] Physician [facility]  [place] Office:  [facility], [address] [place] [address] T: [phone] F: [phone]  [place] Office:  [address] [place] [address], 1st [place] [address] T: [phone] F: [phone]

## 2024-05-10 ENCOUNTER — APPOINTMENT (OUTPATIENT)
Dept: UROLOGY | Facility: CLINIC | Age: 69
End: 2024-05-10
Payer: MEDICARE

## 2024-05-10 VITALS
RESPIRATION RATE: 16 BRPM | SYSTOLIC BLOOD PRESSURE: 103 MMHG | OXYGEN SATURATION: 95 % | HEIGHT: 73 IN | HEART RATE: 81 BPM | WEIGHT: 136 LBS | BODY MASS INDEX: 18.02 KG/M2 | TEMPERATURE: 99.4 F | DIASTOLIC BLOOD PRESSURE: 70 MMHG

## 2024-05-10 PROCEDURE — G2211 COMPLEX E/M VISIT ADD ON: CPT

## 2024-05-10 PROCEDURE — 99215 OFFICE O/P EST HI 40 MIN: CPT

## 2024-05-10 RX ORDER — TAMSULOSIN HYDROCHLORIDE 0.4 MG/1
0.4 CAPSULE ORAL
Qty: 90 | Refills: 3 | Status: ACTIVE | COMMUNITY
Start: 2024-04-08 | End: 1900-01-01

## 2024-05-13 NOTE — HISTORY OF PRESENT ILLNESS
[FreeTextEntry1] : GIRMA SAEED May 23 1955  Language: English Date of First visit: 2024 Accompanied by: self Contact info: Referring Provider/PCP: Dr. omalley Fax:   CC/ Problem List: gross hematuria =============================================================================== FIRST VISIT / Summary: Very pleasant 68 year old M here for gross hematuria. Since  has gross hematuria. Seen by previous urologist where workup was done and was told has severe urinary infection and was treated with levofloxacin x 5 days and hematuria resolved. Has been taking levofloxacin intermittently when he notices blood clots. last took levofloxacin 3/26/24 Drinks about 36 oz water daily. Intermittently takes flomax when he feels urine flow weak but dislikes taking this medication because when he takes "pees like a race horse"  Wife recently passed 2023 from cancer  Requested valium or similar if needs office cysto  IPSS 25 QOL 4 most bothered by frequency Unable to void and presented at [facility] where lozada catheter was placed and presents to office for TOV. he started back on tamsulosin yesterday. Cath removed but ultimately requested catheter be replaced with 231cc in bladder -------------------------------------------------------------------------------------------  INTERVAL VISITS: The patient's medications and allergies were reviewed and edited below. Dated 05/10/2024  He discussed with otolaryngologist - advised have anesthesiologist call prior to intubation. WIll see cardiology May 23 for clearance.   Had spasms but improved now last 2 days. Temp was 100.3   ===============================================================================  PMH: myotonic muscular dystrophy Meds: Tamsulosin, OTC supplement, baby aspirin 81 mg every other day, metoprolol 25mg, lecithin, magnesium 250mg daily, prostate herbal supplement, entacavir 0.5 daily, multivitamins, stool softener All: denies FHx: father  lung cancer SocHx: former smoker quit  smoked 1ppd, wife passed away  PSH: prostate  greenlight and 2018 microwave ablation prostate.  Heart history: mitral valve prolapse, PVCs, a-flutter ablation  Airway: naturally low BP, narrow airway, chronic post-nasal drip, bowed vocal chords (unilateral medialization of right vocal cord silastic implant).  ROS: Review of Systems is as per HPI unless otherwise denoted below  =============================================================================== DATA: LABS (SELECTED):---------------------------------------------------------------------------------------------------   RADS:------------------------------------------------------------------------------------------------------------------- 2024: CT urogram: exophytic rigth renal calcified nodule, stable, otherwise normal kidneys and ureters. Prostate 132cc 7.7x6.5x5 2024: MRI prostate 119cc volume, PSAD 0.11 PIRADS 2  PATHOLOGY/CYTOLOGY:-------------------------------------------------------------------------------------------   VOIDING STUDIES: ---------------------------------------------------------------------------------------------------- 2024 PVR 0 2024 Fill/Pull: filled with 200cc water, catheter removed in its entirety. smal void , drank 2 cups water, small void 2nd . 16 [place] Coude catheter inserted, 250cc clear urine drained, no clots noted 04/15/2024 Instilled 350 voided 180 comfortable.   STONE STUDIES: (Analysis/LLSA)----------------------------------------------------------------------------------   PROCEDURES: -----------------------------------------------------------------------------------------------    =============================================================================== PHYSICAL EXAM:   FOCUSED: ---------------------------------------------------------------------------------------------------------------- Date: 2024 Chaperone: offered and patient declined  Penis: No lesions, tenderness, curvature, plaques. Meatus: normal caliber Testes: Descended bilaterally. Non tender, no palpable masses Epididymi: No palpable epididymal masses Scrotum: Scrotal wall normal. No spermatic cord mass. No palpable hydroceles  Date: 2024 Chaperone: offered and patient declined  Prostate: Abnormal: enlarged approx >50g, nontender, firm, rounded no discrete nodule. Normal lateral contour bilaterally.  ======================================================================================= DISCUSSION: Discussed gland size etc - plan as below will get clearance given anesthesia risk due to narrow airway from otolaryngology as well as cardiology ======================================================================================= ASSESSMENT and PLAN  1. Gross hematuria - UA negative from ER /UCx pending - CTU negative - cystoscopy (if bx needed, will do under anesthesia)  2. Elevated PSA 13 - MRI no lesions - may be due to large size only, PSAD 0.11 - he prefers outlet procedure knowing some chance prostate cancer could be found on pathology  3. 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 Urinary retention -lozada was replaced after cysto 2024 -prescribed Flomax 0.4 mg -recommend HoLEP  5. Bladder tumor - discussed with Dr. Pereira - will consider combined HoLEP and TURBT for diagnosis  ======================================================================================= The total time personally spent preparing for this visit (reviewing test results, obtaining external history) and during the visit (ordering tests/medications, spent face to face with the patient / family and counseling them on the above), as well as after the visit (on clinical documentation and coordination with other care providers) was approximately 45 minutes.  Thank you for allowing me to assist in the care of your patient. Should you have any questions please do not hesitate to reach out to me.   Jayme Barragan MD [facility] Physician [facility] for [facility]  [place] Office:  [facility], [address] [place] [address] T: [phone] F: [phone]  [place] Office:  [facility], [address], 1st [place] [address] T: [phone] F: [phone]

## 2024-05-17 ENCOUNTER — APPOINTMENT (OUTPATIENT)
Dept: HEART AND VASCULAR | Facility: CLINIC | Age: 69
End: 2024-05-17

## 2024-05-20 ENCOUNTER — APPOINTMENT (OUTPATIENT)
Dept: UROLOGY | Facility: CLINIC | Age: 69
End: 2024-05-20
Payer: MEDICARE

## 2024-05-20 VITALS
HEIGHT: 73 IN | BODY MASS INDEX: 18.02 KG/M2 | SYSTOLIC BLOOD PRESSURE: 95 MMHG | DIASTOLIC BLOOD PRESSURE: 55 MMHG | WEIGHT: 136 LBS | HEART RATE: 65 BPM | RESPIRATION RATE: 16 BRPM | OXYGEN SATURATION: 97 % | TEMPERATURE: 97.7 F

## 2024-05-20 DIAGNOSIS — Z01.818 ENCOUNTER FOR OTHER PREPROCEDURAL EXAMINATION: ICD-10-CM

## 2024-05-20 PROCEDURE — 99214 OFFICE O/P EST MOD 30 MIN: CPT | Mod: 25

## 2024-05-20 PROCEDURE — 51702 INSERT TEMP BLADDER CATH: CPT

## 2024-05-22 DIAGNOSIS — A49.9 URINARY TRACT INFECTION, SITE NOT SPECIFIED: ICD-10-CM

## 2024-05-22 DIAGNOSIS — N39.0 URINARY TRACT INFECTION, SITE NOT SPECIFIED: ICD-10-CM

## 2024-05-22 NOTE — HISTORY OF PRESENT ILLNESS
[FreeTextEntry1] : GIMRA SAEED May 23 1955  Language: English Date of First visit: 2024 Accompanied by: self Contact info: Referring Provider/PCP: Dr. omalley Fax:  CC/ Problem List: gross hematuria =============================================================================== FIRST VISIT / Summary: Very pleasant 68 year old M here for gross hematuria. Since 2019 has gross hematuria. Seen by previous urologist where workup was done and was told has severe urinary infection and was treated with levofloxacin x 5 days and hematuria resolved. Has been taking levofloxacin intermittently when he notices blood clots. last took levofloxacin 3/26/24 Drinks about 36 oz water daily. Intermittently takes flomax when he feels urine flow weak but dislikes taking this medication because when he takes "pees like a race horse"  Wife recently passed 2023 from cancer  Requested valium or similar if needs office cysto  IPSS 25 QOL 4 most bothered by frequency Unable to void and presented at [facility] where lozada catheter was placed and presents to office for TOV. he started back on tamsulosin yesterday. Cath removed but ultimately requested catheter be replaced with 231cc in bladder -------------------------------------------------------------------------------------------  INTERVAL VISITS: The patient's medications and allergies were reviewed and edited below. Dated  2024  He discussed with otolaryngologist - advised have anesthesiologist call prior to intubation. WIll see cardiology May 23 for clearance.  Had spasms but improved now last 2 days. Temp was 100.3 prior but not today.  Addendum - called 2024 he is having still some spasms however urine color cleared. He has constipation at baseline and does not want oxybutynin. I will send augmentin to treat enterococcus in urine. To take for 7-10 days then stop. Extra sent for likely preop coverage.  ===============================================================================  PMH: myotonic muscular dystrophy Meds: Tamsulosin, OTC supplement, baby aspirin 81 mg every other day, metoprolol 25mg, lecithin, magnesium 250mg daily, prostate herbal supplement, entacavir 0.5 daily, multivitamins, stool softener All: denies FHx: father  lung cancer SocHx: former smoker quit  smoked 1ppd, wife passed away  PSH: prostatex2: first  greenlight and in  microwave ablation prostate. Heart history: mitral valve prolapse, PVCs, a-flutter ablation Airway: naturally low BP, narrow airway, chronic post-nasal drip, bowed vocal chords (unilateral medialization of right vocal cord silastic implant).  ROS: Review of Systems is as per HPI unless otherwise denoted below  =============================================================================== DATA: LABS (SELECTED):---------------------------------------------------------------------------------------------------   RADS:------------------------------------------------------------------------------------------------------------------- 2024: CT urogram: exophytic rigth renal calcified nodule, stable, otherwise normal kidneys and ureters. Prostate 132cc 7.7x6.5x5 2024: MRI prostate 119cc volume, PSAD 0.11 PIRADS 2  PATHOLOGY/CYTOLOGY:-------------------------------------------------------------------------------------------   VOIDING STUDIES: ---------------------------------------------------------------------------------------------------- 2024 PVR 0 2024 Fill/Pull: filled with 200cc water, catheter removed in its entirety. smal void , drank 2 cups water, small void 2nd . 16 [place] Coude catheter inserted, 250cc clear urine drained, no clots noted 04/15/2024 Instilled 350 voided 180 comfortable.   STONE STUDIES: (Analysis/LLSA)----------------------------------------------------------------------------------   PROCEDURES: -----------------------------------------------------------------------------------------------    =============================================================================== PHYSICAL EXAM:   FOCUSED: ---------------------------------------------------------------------------------------------------------------- Date: 2024 Chaperone: offered and patient declined  Penis: No lesions, tenderness, curvature, plaques. Meatus: normal caliber Testes: Descended bilaterally. Non tender, no palpable masses Epididymi: No palpable epididymal masses Scrotum: Scrotal wall normal. No spermatic cord mass. No palpable hydroceles  Date: 2024 Chaperone: offered and patient declined  Prostate: Abnormal: enlarged >50g, nontender, firm, rounded no discrete nodule. Normal lateral contour bilaterally.  ======================================================================================= DISCUSSION: Discussed gland size etc - plan as below will get clearance given anesthesia risk due to narrow airway from otolaryngology as well as cardiology ======================================================================================= ASSESSMENT and PLAN  1. Gross hematuria - UA negative from ER /UCx pending - CTU negative - cystoscopy (if bx needed, will do under anesthesia)  2. Elevated PSA 13 - MRI no lesions - may be due to large size only, PSAD 0.11 - he prefers outlet procedure knowing some chance prostate cancer could be found on pathology  3. 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 Urinary retention -lozada was replaced after cysto 2024 -prescribed Flomax 0.4 mg -recommend HoLEP  5. Bladder tumor - discussed with Dr. Pereira - will consider combined HoLEP and TURBT for diagnosis  ======================================================================================= The total time personally spent preparing for this visit (reviewing test results, obtaining external history) and during the visit (ordering tests/medications, spent face to face with the patient / family and counseling them on the above), as well as after the visit (on clinical documentation and coordination with other care providers) was approximately 35 minutes plus procedure.   Thank you for allowing me to assist in the care of your patient. Should you have any questions please do not hesitate to reach out to me.   Jayme Barragan MD [facility] Physician [facility] for [facility]  [place] Office:  [facility], [address] [place] [address] T: [phone] F: [phone]  [place] Office:  [address] [place] [address], 1st [place] [address] T: [phone] F: [phone]

## 2024-05-23 ENCOUNTER — APPOINTMENT (OUTPATIENT)
Dept: HEART AND VASCULAR | Facility: CLINIC | Age: 69
End: 2024-05-23
Payer: MEDICARE

## 2024-05-23 ENCOUNTER — NON-APPOINTMENT (OUTPATIENT)
Age: 69
End: 2024-05-23

## 2024-05-23 VITALS
TEMPERATURE: 97.8 F | HEIGHT: 73 IN | SYSTOLIC BLOOD PRESSURE: 90 MMHG | BODY MASS INDEX: 16.43 KG/M2 | WEIGHT: 123.99 LBS | DIASTOLIC BLOOD PRESSURE: 48 MMHG | HEART RATE: 82 BPM | OXYGEN SATURATION: 98 %

## 2024-05-23 DIAGNOSIS — Z01.810 ENCOUNTER FOR PREPROCEDURAL CARDIOVASCULAR EXAMINATION: ICD-10-CM

## 2024-05-23 DIAGNOSIS — I34.1 NONRHEUMATIC MITRAL (VALVE) PROLAPSE: ICD-10-CM

## 2024-05-23 DIAGNOSIS — I49.3 VENTRICULAR PREMATURE DEPOLARIZATION: ICD-10-CM

## 2024-05-23 PROCEDURE — ZZZZZ: CPT | Mod: NC

## 2024-05-23 PROCEDURE — 93000 ELECTROCARDIOGRAM COMPLETE: CPT

## 2024-05-23 PROCEDURE — 93306 TTE W/DOPPLER COMPLETE: CPT

## 2024-05-23 PROCEDURE — 99214 OFFICE O/P EST MOD 30 MIN: CPT

## 2024-05-23 RX ORDER — AMOXICILLIN AND CLAVULANATE POTASSIUM 875; 125 MG/1; MG/1
875-125 TABLET, COATED ORAL
Qty: 56 | Refills: 0 | Status: DISCONTINUED | COMMUNITY
Start: 2024-05-22 | End: 2024-05-23

## 2024-05-24 ENCOUNTER — NON-APPOINTMENT (OUTPATIENT)
Age: 69
End: 2024-05-24

## 2024-05-24 ENCOUNTER — APPOINTMENT (OUTPATIENT)
Dept: UROLOGY | Facility: CLINIC | Age: 69
End: 2024-05-24
Payer: MEDICARE

## 2024-05-24 VITALS
OXYGEN SATURATION: 92 % | DIASTOLIC BLOOD PRESSURE: 66 MMHG | SYSTOLIC BLOOD PRESSURE: 105 MMHG | HEART RATE: 62 BPM | TEMPERATURE: 97.8 F | BODY MASS INDEX: 16.3 KG/M2 | HEIGHT: 73 IN | WEIGHT: 123 LBS

## 2024-05-24 DIAGNOSIS — R31.0 GROSS HEMATURIA: ICD-10-CM

## 2024-05-24 PROCEDURE — 99214 OFFICE O/P EST MOD 30 MIN: CPT

## 2024-05-24 NOTE — ASSESSMENT
[FreeTextEntry1] : 69 year old man with history of myotonic muscular dysrtrophy, narrow airway with bowed vocal chords, 119 cc prostate with urinary retention and a 5 mm bladder tumor. Discussed plan for prostate debulking procedure and TURBT with Dr. Barragan and patient.  Mr. SAEED decided to proceed with laser enucleation of prostate followed by prostate morcellation. Therefore, I spent a good amount of time discussing the risks and benefits of this procedure. We discussed potential risks of incontinence, retrograde ejaculation and infertility, erectile dysfunction, irritative voiding symptoms, injury to the urethra and bladder, rare need to convert to an open surgery.   - OR for HoLEP and TURBT, likely 6/7/24

## 2024-05-24 NOTE — HISTORY OF PRESENT ILLNESS
[FreeTextEntry1] : 69 year old patient referred by Dr. Barragan for urinary retention and bladder tumor. 119 gram prostate.   He has hx of 2 prior prostate procedures: 2011 greenlight and 2018 microwave. He has chronic LUTS, was taking flomax intermittently, difficulty taking due to low blood pressure baseline. Recently went into urinary retention, failed void trials. Catheter last changed 5/6/24. Currently taking augmentin for positive urine culture, was having bladder spasms, less severe since starting antibiotic.  He has had recurrent gross hematuria for many years. CT urogram negative. 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  Chronic LUTS  Has elevated PSA of 13, MRI negative.

## 2024-05-27 LAB — BACTERIA UR CULT: ABNORMAL

## 2024-05-29 NOTE — DISCUSSION/SUMMARY
[EKG obtained to assist in diagnosis and management of assessed problem(s)] : EKG obtained to assist in diagnosis and management of assessed problem(s) [FreeTextEntry1] : stable exam structurally normal heart by echocardiogram, no significant valve disease, LVEF difficult to determine due  frequent VPCs, probable mildly decreased Vpc- followed by EP, maintained on B Blocker and Mg no active angina/CHF no cardiac contra indication to planned procedure  Addendum 5/29- remains stable for surgery

## 2024-05-30 ENCOUNTER — APPOINTMENT (OUTPATIENT)
Dept: RADIOLOGY | Facility: CLINIC | Age: 69
End: 2024-05-30
Payer: MEDICARE

## 2024-05-30 PROCEDURE — 71046 X-RAY EXAM CHEST 2 VIEWS: CPT

## 2024-06-03 LAB
ANION GAP SERPL CALC-SCNC: 9 MMOL/L
APTT BLD: 32.1 SEC
BASOPHILS # BLD AUTO: 0.05 K/UL
BASOPHILS NFR BLD AUTO: 0.5 %
BUN SERPL-MCNC: 19 MG/DL
CALCIUM SERPL-MCNC: 9.8 MG/DL
CHLORIDE SERPL-SCNC: 108 MMOL/L
CO2 SERPL-SCNC: 27 MMOL/L
CREAT SERPL-MCNC: 0.96 MG/DL
EGFR: 85 ML/MIN/1.73M2
EOSINOPHIL # BLD AUTO: 0.26 K/UL
EOSINOPHIL NFR BLD AUTO: 2.5 %
GLUCOSE SERPL-MCNC: 87 MG/DL
HCT VFR BLD CALC: 42.2 %
HGB BLD-MCNC: 13.2 G/DL
IMM GRANULOCYTES NFR BLD AUTO: 0.4 %
INR PPP: 0.96 RATIO
LYMPHOCYTES # BLD AUTO: 1.74 K/UL
LYMPHOCYTES NFR BLD AUTO: 16.9 %
MAN DIFF?: NORMAL
MCHC RBC-ENTMCNC: 28.5 PG
MCHC RBC-ENTMCNC: 31.3 GM/DL
MCV RBC AUTO: 91.1 FL
MONOCYTES # BLD AUTO: 0.67 K/UL
MONOCYTES NFR BLD AUTO: 6.5 %
NEUTROPHILS # BLD AUTO: 7.52 K/UL
NEUTROPHILS NFR BLD AUTO: 73.2 %
PLATELET # BLD AUTO: 212 K/UL
POTASSIUM SERPL-SCNC: 4.9 MMOL/L
PT BLD: 11 SEC
RBC # BLD: 4.63 M/UL
RBC # FLD: 14.8 %
SODIUM SERPL-SCNC: 145 MMOL/L
WBC # FLD AUTO: 10.28 K/UL

## 2024-06-04 RX ORDER — AMOXICILLIN AND CLAVULANATE POTASSIUM 875; 125 MG/1; MG/1
875-125 TABLET, COATED ORAL
Qty: 12 | Refills: 0 | Status: ACTIVE | COMMUNITY
Start: 2024-06-04 | End: 1900-01-01

## 2024-06-06 ENCOUNTER — TRANSCRIPTION ENCOUNTER (OUTPATIENT)
Age: 69
End: 2024-06-06

## 2024-06-06 VITALS
WEIGHT: 127.87 LBS | RESPIRATION RATE: 16 BRPM | TEMPERATURE: 98 F | HEART RATE: 53 BPM | SYSTOLIC BLOOD PRESSURE: 99 MMHG | HEIGHT: 73 IN | OXYGEN SATURATION: 98 % | DIASTOLIC BLOOD PRESSURE: 63 MMHG

## 2024-06-06 RX ORDER — LECITHIN 1200 MG
1 CAPSULE ORAL
Refills: 0 | DISCHARGE

## 2024-06-06 NOTE — PRE-OP CHECKLIST - BETA DATE TIME
Spoke to patient advised to follow up with Dr. Gonzalez. Patient verbalized understanding and will discuss further at upcoming appointment in july   06-Jun-2024 22:00

## 2024-06-06 NOTE — ASU PATIENT PROFILE, ADULT - NSTOBACCONEVERSMOKERY/N_GEN_A
Hypertension is stable. to monitor BP at home. Continue current meds. Continue to modify diet and lifestyle.      Yes

## 2024-06-06 NOTE — PRE-OP CHECKLIST - PATIENT'S PERSONAL PROPERTY GIVEN TO
7/26/2022      Pranav Steele  [address] [facility] [id number]                        This is to certify that Pranav Steele has been seen by [facility]  and is unable to return to work/school until further notice due to recent surgery     Estimate 2 weeks until possible light duty return and 6 weeks before full duty return. .       SIGNATURE:___________________________________________,   7/26/2022      No name on file.          [facility]  [address] [place] [id number]   family member/on unit

## 2024-06-06 NOTE — ASU PATIENT PROFILE, ADULT - NSICDXPASTSURGICALHX_GEN_ALL_CORE_FT
PAST SURGICAL HISTORY:  History of cataract surgery BILAT    History of prostate surgery      PAST SURGICAL HISTORY:  History of cataract surgery BILAT    History of prostate surgery x 2    History of tonsillectomy

## 2024-06-06 NOTE — ASU PATIENT PROFILE, ADULT - NSICDXPASTMEDICALHX_GEN_ALL_CORE_FT
PAST MEDICAL HISTORY:  Atrial flutter     Bladder tumor     Epididymal mass     H/O urinary retention     History of BPH     Liver lesion     MVP (mitral valve prolapse)     Premature ventricular contractions (PVCs) (VPCs)     SOB (shortness of breath)      PAST MEDICAL HISTORY:  Anxiety and depression     Atrial flutter ABLATION    Bladder tumor     Epididymal mass     H/O urinary retention     History of BPH     Liver lesion     MVP (mitral valve prolapse)     Myotonic dystrophy     Premature ventricular contractions (PVCs) (VPCs)     SOB (shortness of breath)      PAST MEDICAL HISTORY:  Acid reflux     Anxiety and depression     Atrial flutter ABLATION    Bladder tumor     Epididymal mass     H/O urinary retention     Hemorrhoids     History of BPH     Liver lesion     MVP (mitral valve prolapse)     Myotonic dystrophy     Premature ventricular contractions (PVCs) (VPCs)     SOB (shortness of breath)

## 2024-06-06 NOTE — ASU PATIENT PROFILE, ADULT - FALL HARM RISK - UNIVERSAL INTERVENTIONS
Bed in lowest position, wheels locked, appropriate side rails in place/Call bell, personal items and telephone in reach/Instruct patient to call for assistance before getting out of bed or chair/Non-slip footwear when patient is out of bed/[place] to call system/Physically safe environment - no spills, clutter or unnecessary equipment/Purposeful Proactive Rounding/Room/bathroom lighting operational, light cord in reach

## 2024-06-07 ENCOUNTER — INPATIENT (INPATIENT)
Facility: HOSPITAL | Age: 69
LOS: 0 days | Discharge: HOME | DRG: 713 | End: 2024-06-08
Attending: SURGERY | Admitting: SURGERY
Payer: MEDICARE

## 2024-06-07 ENCOUNTER — RESULT REVIEW (OUTPATIENT)
Age: 69
End: 2024-06-07

## 2024-06-07 ENCOUNTER — APPOINTMENT (OUTPATIENT)
Dept: UROLOGY | Facility: HOSPITAL | Age: 69
End: 2024-06-07

## 2024-06-07 DIAGNOSIS — Z98.890 OTHER SPECIFIED POSTPROCEDURAL STATES: Chronic | ICD-10-CM

## 2024-06-07 DIAGNOSIS — Z98.49 CATARACT EXTRACTION STATUS, UNSPECIFIED EYE: Chronic | ICD-10-CM

## 2024-06-07 DIAGNOSIS — Z90.89 ACQUIRED ABSENCE OF OTHER ORGANS: Chronic | ICD-10-CM

## 2024-06-07 LAB
ALBUMIN SERPL ELPH-MCNC: 3.7 G/DL — SIGNIFICANT CHANGE UP (ref 3.3–5)
ALP SERPL-CCNC: 85 U/L — SIGNIFICANT CHANGE UP (ref 40–120)
ALT FLD-CCNC: 13 U/L — SIGNIFICANT CHANGE UP (ref 10–45)
ANION GAP SERPL CALC-SCNC: 11 MMOL/L — SIGNIFICANT CHANGE UP (ref 5–17)
AST SERPL-CCNC: 20 U/L — SIGNIFICANT CHANGE UP (ref 10–40)
BILIRUB SERPL-MCNC: 1.2 MG/DL — SIGNIFICANT CHANGE UP (ref 0.2–1.2)
BUN SERPL-MCNC: 14 MG/DL — SIGNIFICANT CHANGE UP (ref 7–23)
CALCIUM SERPL-MCNC: 9.3 MG/DL — SIGNIFICANT CHANGE UP (ref 8.4–10.5)
CHLORIDE SERPL-SCNC: 109 MMOL/L — HIGH (ref 96–108)
CO2 SERPL-SCNC: 28 MMOL/L — SIGNIFICANT CHANGE UP (ref 22–31)
CREAT SERPL-MCNC: 0.87 MG/DL — SIGNIFICANT CHANGE UP (ref 0.5–1.3)
EGFR: 93 ML/MIN/1.73M2 — SIGNIFICANT CHANGE UP
GLUCOSE SERPL-MCNC: 79 MG/DL — SIGNIFICANT CHANGE UP (ref 70–99)
HCT VFR BLD CALC: 39.8 % — SIGNIFICANT CHANGE UP (ref 39–50)
HGB BLD-MCNC: 12.8 G/DL — LOW (ref 13–17)
MAGNESIUM SERPL-MCNC: 2 MG/DL — SIGNIFICANT CHANGE UP (ref 1.6–2.6)
MCHC RBC-ENTMCNC: 28.6 PG — SIGNIFICANT CHANGE UP (ref 27–34)
MCHC RBC-ENTMCNC: 32.2 GM/DL — SIGNIFICANT CHANGE UP (ref 32–36)
MCV RBC AUTO: 88.8 FL — SIGNIFICANT CHANGE UP (ref 80–100)
NRBC # BLD: 0 /100 WBCS — SIGNIFICANT CHANGE UP (ref 0–0)
PLATELET # BLD AUTO: 224 K/UL — SIGNIFICANT CHANGE UP (ref 150–400)
POTASSIUM SERPL-MCNC: 3.8 MMOL/L — SIGNIFICANT CHANGE UP (ref 3.5–5.3)
POTASSIUM SERPL-SCNC: 3.8 MMOL/L — SIGNIFICANT CHANGE UP (ref 3.5–5.3)
PROT SERPL-MCNC: 6.3 G/DL — SIGNIFICANT CHANGE UP (ref 6–8.3)
RBC # BLD: 4.48 M/UL — SIGNIFICANT CHANGE UP (ref 4.2–5.8)
RBC # FLD: 13.9 % — SIGNIFICANT CHANGE UP (ref 10.3–14.5)
SODIUM SERPL-SCNC: 148 MMOL/L — HIGH (ref 135–145)
TSH SERPL-MCNC: 2.35 UIU/ML — SIGNIFICANT CHANGE UP (ref 0.27–4.2)
WBC # BLD: 8.99 K/UL — SIGNIFICANT CHANGE UP (ref 3.8–10.5)
WBC # FLD AUTO: 8.99 K/UL — SIGNIFICANT CHANGE UP (ref 3.8–10.5)

## 2024-06-07 PROCEDURE — 52234 CYSTOSCOPY AND TREATMENT: CPT | Mod: 59

## 2024-06-07 PROCEDURE — 52649 PROSTATE LASER ENUCLEATION: CPT | Mod: 22

## 2024-06-07 PROCEDURE — 88305 TISSUE EXAM BY PATHOLOGIST: CPT | Mod: 26

## 2024-06-07 PROCEDURE — 71045 X-RAY EXAM CHEST 1 VIEW: CPT | Mod: 26

## 2024-06-07 DEVICE — LASER FIBER MOSES 550 D/F/L: Type: IMPLANTABLE DEVICE | Status: FUNCTIONAL

## 2024-06-07 DEVICE — MOCELLATOR ROTATION SINGLEUSE 4.75: Type: IMPLANTABLE DEVICE | Status: FUNCTIONAL

## 2024-06-07 RX ORDER — LIDOCAINE HCL 20 MG/ML
5 VIAL (ML) INJECTION THREE TIMES A DAY
Refills: 0 | Status: DISCONTINUED | OUTPATIENT
Start: 2024-06-07 | End: 2024-06-08

## 2024-06-07 RX ORDER — METOPROLOL TARTRATE 50 MG
25 TABLET ORAL DAILY
Refills: 0 | Status: DISCONTINUED | OUTPATIENT
Start: 2024-06-07 | End: 2024-06-07

## 2024-06-07 RX ORDER — AMPICILLIN TRIHYDRATE 250 MG
2 CAPSULE ORAL EVERY 6 HOURS
Refills: 0 | Status: COMPLETED | OUTPATIENT
Start: 2024-06-07 | End: 2024-06-07

## 2024-06-07 RX ORDER — MAGNESIUM GLYCINATE 100 MG
2 CAPSULE ORAL
Refills: 0 | DISCHARGE

## 2024-06-07 RX ORDER — SODIUM CHLORIDE 9 MG/ML
500 INJECTION, SOLUTION INTRAVENOUS ONCE
Refills: 0 | Status: COMPLETED | OUTPATIENT
Start: 2024-06-07 | End: 2024-06-07

## 2024-06-07 RX ORDER — ENTECAVIR 0.5 MG/1
1 TABLET ORAL
Refills: 0 | DISCHARGE

## 2024-06-07 RX ORDER — METOPROLOL TARTRATE 50 MG
1 TABLET ORAL
Refills: 0 | DISCHARGE

## 2024-06-07 RX ORDER — PHENAZOPYRIDINE HCL 100 MG
100 TABLET ORAL EVERY 8 HOURS
Refills: 0 | Status: DISCONTINUED | OUTPATIENT
Start: 2024-06-07 | End: 2024-06-08

## 2024-06-07 RX ORDER — ASPIRIN/CALCIUM CARB/MAGNESIUM 324 MG
1 TABLET ORAL
Refills: 0 | DISCHARGE

## 2024-06-07 RX ORDER — SODIUM CHLORIDE 9 MG/ML
1000 INJECTION, SOLUTION INTRAVENOUS
Refills: 0 | Status: DISCONTINUED | OUTPATIENT
Start: 2024-06-07 | End: 2024-06-08

## 2024-06-07 RX ORDER — ENTECAVIR 0.5 MG/1
0.5 TABLET ORAL DAILY
Refills: 0 | Status: DISCONTINUED | OUTPATIENT
Start: 2024-06-07 | End: 2024-06-08

## 2024-06-07 RX ORDER — ACETAMINOPHEN 500 MG
1000 TABLET ORAL EVERY 6 HOURS
Refills: 0 | Status: DISCONTINUED | OUTPATIENT
Start: 2024-06-07 | End: 2024-06-08

## 2024-06-07 RX ORDER — TAMSULOSIN HYDROCHLORIDE 0.4 MG/1
1 CAPSULE ORAL
Refills: 0 | DISCHARGE

## 2024-06-07 RX ORDER — ASPIRIN/CALCIUM CARB/MAGNESIUM 324 MG
81 TABLET ORAL DAILY
Refills: 0 | Status: DISCONTINUED | OUTPATIENT
Start: 2024-06-07 | End: 2024-06-08

## 2024-06-07 RX ORDER — HEPARIN SODIUM 5000 [USP'U]/ML
5000 INJECTION INTRAVENOUS; SUBCUTANEOUS EVERY 8 HOURS
Refills: 0 | Status: DISCONTINUED | OUTPATIENT
Start: 2024-06-07 | End: 2024-06-08

## 2024-06-07 RX ADMIN — Medication 216 GRAM(S): at 17:36

## 2024-06-07 RX ADMIN — HEPARIN SODIUM 5000 UNIT(S): 5000 INJECTION INTRAVENOUS; SUBCUTANEOUS at 22:00

## 2024-06-07 RX ADMIN — SODIUM CHLORIDE 1000 MILLILITER(S): 9 INJECTION, SOLUTION INTRAVENOUS at 12:00

## 2024-06-07 RX ADMIN — SODIUM CHLORIDE 100 MILLILITER(S): 9 INJECTION, SOLUTION INTRAVENOUS at 12:52

## 2024-06-07 RX ADMIN — Medication 216 GRAM(S): at 23:33

## 2024-06-07 RX ADMIN — Medication 81 MILLIGRAM(S): at 17:35

## 2024-06-07 NOTE — PATIENT PROFILE ADULT - FALL HARM RISK - FALL HARM RISK
Surgery Cyclophosphamide Counseling:  I discussed with the patient the risks of cyclophosphamide including but not limited to hair loss, hormonal abnormalities, decreased fertility, abdominal pain, diarrhea, nausea and vomiting, bone marrow suppression and infection. The patient understands that monitoring is required while taking this medication.

## 2024-06-07 NOTE — BRIEF OPERATIVE NOTE - ANESTHESIOLOGIST NAME
Preventive Health Recommendations  Male Ages 50 - 64    Yearly exam:             See your health care provider every year in order to  o   Review health changes.   o   Discuss preventive care.    o   Review your medicines if your doctor has prescribed any.     Have a cholesterol test every 5 years, or more frequently if you are at risk for high cholesterol/heart disease.     Have a diabetes test (fasting glucose) every three years. If you are at risk for diabetes, you should have this test more often.     Have a colonoscopy at age 50, or have a yearly FIT test (stool test). These exams will check for colon cancer.      Talk with your health care provider about whether or not a prostate cancer screening test (PSA) is right for you.    You should be tested each year for STDs (sexually transmitted diseases), if you re at risk.     Shots: Get a flu shot each year. Get a tetanus shot every 10 years.     Nutrition:    Eat at least 5 servings of fruits and vegetables daily.     Eat whole-grain bread, whole-wheat pasta and brown rice instead of white grains and rice.     Get adequate Calcium and Vitamin D.     Lifestyle    Exercise for at least 150 minutes a week (30 minutes a day, 5 days a week). This will help you control your weight and prevent disease.     Limit alcohol to one drink per day.     No smoking.     Wear sunscreen to prevent skin cancer.     See your dentist every six months for an exam and cleaning.     See your eye doctor every 1 to 2 years.          Stay on same meds    Keep working on healthy diet/exercise     We will send you lab results    Call with problems/questions      
Dr. Landry

## 2024-06-07 NOTE — PATIENT PROFILE ADULT - FALL HARM RISK - HARM RISK INTERVENTIONS

## 2024-06-07 NOTE — PRE-ANESTHESIA EVALUATION ADULT - NSANTHPEFT_GEN_ALL_CORE
General: AAOx3, NAD  Eyes: The sclera and conjunctiva normal, pupils equal in size.  ENT: The ears and nose were normal in appearance; oropharynx clear, moist mucus membranes.  Neck: The appearance of the neck was normal, with no gross masses or nodules.  Respiratory: Unlabored, no retractions.  CV: RRR  Neurological: No focal deficit, moves all extremities.  Exercise Tolerance:  METS limited by " narrowed airways" General: AAOx3, NAD  Eyes: The sclera and conjunctiva normal, pupils equal in size.  ENT: The ears and nose were normal in appearance; oropharynx clear, moist mucus membranes.  narrow facial structure  Neck: The appearance of the neck was normal, with no gross masses or nodules.  Respiratory: Unlabored, no retractions.  CV: RRR  Neurological: No focal deficit, moves all extremities.  Exercise Tolerance:  METS limited by " narrowed airways"

## 2024-06-07 NOTE — CONSULT NOTE ADULT - SUBJECTIVE AND OBJECTIVE BOX
CHIEF COMPLAINT:    HPI:  69M PMH a flutter s/p ablation, muscular dystrophy, and BPH with acute urinary retention with chronic lozada presents for elective HoLEP and TURBT for bladder tumor. 120g prostate.    Preop, UCx positive for staph. He has been on Augmentin. (07 Jun 2024 08:15)    Consultant HPI: 69M w/ pmhx of Aflutter s/p ablation (2012), muscular dystrophy and BPH p/f elective TURP which was performed on 6/7. Pt notes his resting HR is usually in the 50s and 60s. Does admit to some lightheadedness and pre-syncope episodes but notes that these are associated with a dropping of his BP. Denies any decreased exertional capacity, LE swelling, orthopnea, cp at rest or exertion.     PAST MEDICAL & SURGICAL HISTORY:  H/O urinary retention      Atrial flutter  ABLATION      Bladder tumor      History of BPH      Epididymal mass      Liver lesion      MVP (mitral valve prolapse)      SOB (shortness of breath)      Premature ventricular contractions (PVCs) (VPCs)      Myotonic dystrophy      Anxiety and depression      Hemorrhoids      Acid reflux      History of cataract surgery  BILAT      History of prostate surgery  x 2      History of tonsillectomy        [ ] Diabetes   [ ] Hypertension  [ ] Hyperlipidemia  [ ] CAD  [ ] PCI  [ ] CABG    PREVIOUS DIAGNOSTIC TESTING:    [ ] Echocardiogram:  [ ] Stress Test:  [ ] Catheterization: 	    FAMILY HISTORY:    SOCIAL HISTORY:    [ ] Non-smoker  [ ] Current Smoker  [ ] Former Smoker  [ ] Alcohol Use  [ ] Drug Use    ALLERGIES/INTOLERANCES:  No Known Allergies    HOME MEDICATIONS:    INPATIENT MEDICATIONS:    aspirin  chewable 81 milliGRAM(s) Oral daily  heparin   Injectable 5000 Unit(s) SubCutaneous every 8 hours    acetaminophen     Tablet .. 1000 milliGRAM(s) Oral every 6 hours PRN  ampicillin  IVPB 2 Gram(s) IV Intermittent every 6 hours  entecavir 0.5 milliGRAM(s) Oral daily  lactated ringers. 1000 milliLiter(s) IV Continuous <Continuous>  phenazopyridine 100 milliGRAM(s) Oral every 8 hours PRN      REVIEW OF SYSTEMS:    CONSTITUTIONAL: No weakness, F/C, wt loss/gain  EYES: No visual changes/disturbances  ENMT: No dry mouth, no vertigo  NECK: No pain or stiffness  RESPIRATORY: No cough, wheezing, hemoptysis; No shortness of breath  CARDIOVASCULAR: No chest pain, palpitations, lightheadedness/dizziness, LOC, or leg swelling  GASTROINTESTINAL: No abdominal or epigastric pain. No N/V/D/C. No melena, hematochezia, or hematemesis.  GENITOURINARY: No dysuria, increased frequency, hematuria, or incontinence  NEUROLOGICAL: No lightheadedness/dizziness, LOC, headaches, numbness or weakness  MUSCULOSKELETAL: No joint pain or swelling; No muscle, back, or extremity pain  SKIN: No itching, burning, rashes, or lesions   ENDOCRINE: No heat or cold intolerance; No hair loss  HEME/LYMPH: No easy bruising, or bleeding gums  PSYCHIATRIC: No depression, anxiety, mood swings, or difficulty sleeping    [ ] All other review of systems are negative unless indicated above.  [ ] Unable to obtain due to:    PHYSICAL EXAM:    T(C): 36.4 (06-07-24 @ 22:07), Max: 36.4 (06-07-24 @ 07:48)  HR: 48 (06-07-24 @ 20:25) (44 - 80)  BP: 91/52 (06-07-24 @ 20:25) (86/49 - 129/69)  RR: 17 (06-07-24 @ 20:25) (13 - 25)  SpO2: 94% (06-07-24 @ 20:25) (94% - 100%)  Wt(kg): --    I&O's Summary    07 Jun 2024 07:01  -  07 Jun 2024 22:48  --------------------------------------------------------  IN: 1000 mL / OUT: 0 mL / NET: 1000 mL    GENERAL: NAD, well-developed  HEAD:  NCAT  HEENT: EOMI, PERRL, conjunctiva and sclera clear; moist mucosa; Neck supple, No JVD  CARDIOVASCULAR: RRR, normal S1 S2, no M/R/G, no JVD, neg HJR, nondisplaced PMI, no LE edema  RESPIRATORY: Lungs clear to auscultation b/l, no C/W/R  GASTROINTESTINAL: +BS, soft, non-distended, non-tender, no HSM  VASCULAR: Peripheral pulses palpable 2+ bilaterally  EXTREMITIES: Warm. No clubbing, cyanosis or edema. Normal range of motion.  SKIN: No rashes, lesions, ecchymoses, or cyanosis  NEURO: AAOx3, no focal deficits  PSYCH: Nl behavior, nl affect  LINES:    TELEMETRY: 	      ECG:  	  	  LABS:                        12.8   8.99  )-----------( 224      ( 07 Jun 2024 18:10 )             39.8     06-07    148<H>  |  109<H>  |  14  ----------------------------<  79  3.8   |  28  |  0.87    Ca    9.3      07 Jun 2024 18:10  Mg     2.0     06-07    TPro  6.3  /  Alb  3.7  /  TBili  1.2  /  DBili  x   /  AST  20  /  ALT  13  /  AlkPhos  85  06-07      Lipid Profile:   HgA1c:   TSH: Thyroid Stimulating Hormone, Serum: 2.350 uIU/mL (06-07 @ 18:10)      CARDIAC MARKERS:          proBNP:     RADIOLOGY:      ASSESSMENT/PLAN: 	     CHIEF COMPLAINT:    HPI:  69M PMH a flutter s/p ablation, muscular dystrophy, and BPH with acute urinary retention with chronic lozada presents for elective HoLEP and TURBT for bladder tumor. 120g prostate.    Preop, UCx positive for staph. He has been on Augmentin. (2024 08:15)    Consultant HPI: 69M w/ pmhx of Aflutter s/p ablation (), muscular dystrophy and BPH p/f elective TURP which was performed on . Pt notes his resting HR is usually in the 50s and 60s. Does admit to some lightheadedness and pre-syncope episodes but notes that these are associated with a dropping of his BP. Denies any decreased exertional capacity, LE swelling, orthopnea, cp at rest or exertion. Pt had good chronotropic competence during the interview when pedaling his legs getting up to a HR 75    PAST MEDICAL & SURGICAL HISTORY:  H/O urinary retention      Atrial flutter  ABLATION      Bladder tumor      History of BPH      Epididymal mass      Liver lesion      MVP (mitral valve prolapse)      SOB (shortness of breath)      Premature ventricular contractions (PVCs) (VPCs)      Myotonic dystrophy      Anxiety and depression      Hemorrhoids      Acid reflux      History of cataract surgery  BILAT      History of prostate surgery  x 2      History of tonsillectomy        [ ] Diabetes   [ ] Hypertension  [ ] Hyperlipidemia  [ ] CAD  [ ] PCI  [ ] CABG    PREVIOUS DIAGNOSTIC TESTING:    [ ] Echocardiogram:  [ ] Stress Test:  [ ] Catheterization: 	    FAMILY HISTORY:    SOCIAL HISTORY:    [ ] Non-smoker  [ ] Current Smoker  [ ] Former Smoker  [ ] Alcohol Use  [ ] Drug Use    ALLERGIES/INTOLERANCES:  No Known Allergies    HOME MEDICATIONS:    INPATIENT MEDICATIONS:    aspirin  chewable 81 milliGRAM(s) Oral daily  heparin   Injectable 5000 Unit(s) SubCutaneous every 8 hours    acetaminophen     Tablet .. 1000 milliGRAM(s) Oral every 6 hours PRN  ampicillin  IVPB 2 Gram(s) IV Intermittent every 6 hours  entecavir 0.5 milliGRAM(s) Oral daily  lactated ringers. 1000 milliLiter(s) IV Continuous <Continuous>  phenazopyridine 100 milliGRAM(s) Oral every 8 hours PRN      REVIEW OF SYSTEMS:      [x ] All other review of systems are negative unless indicated above.  [ ] Unable to obtain due to:    PHYSICAL EXAM:    T(C): 36.4 (24 @ 22:07), Max: 36.4 (24 @ 07:48)  HR: 48 (24 @ 20:25) (44 - 80)  BP: 91/52 (24 @ 20:25) (86/49 - 129/69)  RR: 17 (24 @ 20:25) (13 - 25)  SpO2: 94% (24 @ 20:25) (94% - 100%)  Wt(kg): --    I&O's Summary    2024 07:01  -  2024 22:48  --------------------------------------------------------  IN: 1000 mL / OUT: 0 mL / NET: 1000 mL    GENERAL: NAD  HEENT: No JVD  CARDIOVASCULAR: Regular bradycardic RR, normal S1 S2, no M/R/G  RESPIRATORY: Lungs clear to auscultation b/l, no C/W/R  VASCULAR: Peripheral pulses palpable 2+ bilaterally  EXTREMITIES: Warm. No LE edema    LINES:    TELEMETRY: Sinus 30s -->50s-60s 	      EC/7 1611: Sinus bradycardia with 1st AVB  	  	  LABS:                        12.8   8.99  )-----------( 224      ( 2024 18:10 )             39.8         148<H>  |  109<H>  |  14  ----------------------------<  79  3.8   |  28  |  0.87    Ca    9.3      2024 18:10  Mg     2.0         TPro  6.3  /  Alb  3.7  /  TBili  1.2  /  DBili  x   /  AST  20  /  ALT  13  /  AlkPhos  85        Lipid Profile:   HgA1c:   TSH: Thyroid Stimulating Hormone, Serum: 2.350 uIU/mL ( @ 18:10)      CARDIAC MARKERS:          proBNP:     RADIOLOGY:      ASSESSMENT/PLAN:

## 2024-06-07 NOTE — BRIEF OPERATIVE NOTE - NSICDXBRIEFPROCEDURE_GEN_ALL_CORE_FT
PROCEDURES:  TURBT, with biopsy 07-Jun-2024 12:08:01  Sudeep Gamble  Cystoscopy with enucleation of prostate using laser 07-Jun-2024 12:08:20  Sudeep Gamble

## 2024-06-07 NOTE — H&P ADULT - ASSESSMENT
69M PMH a flutter s/p ablation, muscular dystrophy, and BPH with acute urinary retention with chronic lozada presents for elective HoLEP and TURBT for bladder tumor. 120g prostate.    Preop, UCx positive for staph. He has been on Augmentin.    Plan  OR  NPO

## 2024-06-07 NOTE — BRIEF OPERATIVE NOTE - NSICDXBRIEFPREOP_GEN_ALL_CORE_FT
PRE-OP DIAGNOSIS:  BPH with obstruction/lower urinary tract symptoms 07-Jun-2024 12:08:34  Sudeep Gamble

## 2024-06-07 NOTE — H&P ADULT - NSICDXPASTMEDICALHX_GEN_ALL_CORE_FT
PAST MEDICAL HISTORY:  Acid reflux     Anxiety and depression     Atrial flutter ABLATION    Bladder tumor     Epididymal mass     H/O urinary retention     Hemorrhoids     History of BPH     Liver lesion     MVP (mitral valve prolapse)     Myotonic dystrophy     Premature ventricular contractions (PVCs) (VPCs)     SOB (shortness of breath)

## 2024-06-07 NOTE — PRE-ANESTHESIA EVALUATION ADULT - LAST ECHOCARDIOGRAM
nl 2024 5/2024 - nl structure, EF difficult to determine bec of PVC but likely just mildly decreased, otherwise nl

## 2024-06-07 NOTE — BRIEF OPERATIVE NOTE - OPERATION/FINDINGS
patient in urinary retention. previous lozada removed. small posterior lesion biopsied in bladder. 120 g prostate enucleated and morcellated. lozada placed at end of case on CBI

## 2024-06-07 NOTE — PRE-ANESTHESIA EVALUATION ADULT - NSANTHADDINFOFT_GEN_ALL_CORE
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  has muscle weakness in lower ext,  - able to ambulate but limited distances bec of SOB reportedly secondary to narrowed airways.    ENT note with ok to intubate with 5.5 ETT  GERD better controlled - no meds.    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  Has muscle weakness in lower ext,  - able to ambulate but limited distances bec of SOB reportedly secondary to narrowed airways.    ENT note with ok to intubate with 5.5 ETT  GERD better controlled - no meds.  food dependent    R/B/A d/w  pt - will place neuraxial anesthesia

## 2024-06-07 NOTE — H&P ADULT - GENITOURINARY MALE
normal external genitalia/no discharge/no mass/no tenderness/no ulcer/normal/no penile lesion/no scrotal mass

## 2024-06-07 NOTE — CONSULT NOTE ADULT - ATTENDING COMMENTS
70 yo man with chronic PVCs on BB followed by Dr. Sánchez   Admitted electively for TURP had persistent bradycardia with monomorphic PVCs after surgery   Cardiology consulted for this  Patient asymptomatic doing well     - Hold BB on discharge, follow with cardiologist to resume  - No further cardiac interventions     All Muhammad MD

## 2024-06-07 NOTE — PATIENT PROFILE ADULT - FUNCTIONAL ASSESSMENT - BASIC MOBILITY 6.
3-calculated by average/Not able to assess (calculate score using [facility] averaging method)

## 2024-06-07 NOTE — CONSULT NOTE ADULT - ASSESSMENT
69M w/ pmhx of Aflutter s/p ablation (2012), muscular dystrophy and BPH p/f elective TURP which was performed on 6/7. Cardiology consulted for bradycardia    #Bradycardia  Pt with HR in the 30s on tele around around 3pm, during that time the patient was in slow AF. Around 4pm the patient converted to sinus with HR in the 40s now increasing to 50-60s at rest with good chronotropic response to the 70s. Pt has been moving around with no sxs and denies any sxs at rest and has remained HD stable. Bradycardia to the 30s may have bee related to post-op anesthesia.  - Hold home Metoprolol 25mg QD  - Continue to monitor HR  - Obtain AM ECG to monitor for LA interval  - No indication for PPM at this time    #AF/AFl, stage 3A  Pt with pAF, currently in sinus. Takes ASA 81mg since ablation in 2012. Takes Metoprolol 25mg QD. CHADSVASC 1 (Age 65-74)  - Hold home Toprol as above  - No need for AC at this time  - Recommend outpatient EP follow-up      Recommendations above are preliminary pending discussion with an attending cardiologist  We'll continue to follow, thank you for the consultation      Edward Barrett ([id number])  Cardiovascular Disease Fellow

## 2024-06-07 NOTE — H&P ADULT - HISTORY OF PRESENT ILLNESS
69M PMH a flutter s/p ablation, muscular dystrophy, and BPH with acute urinary retention with chronic lozada presents for elective HoLEP and TURBT for bladder tumor. 120g prostate.    Preop, UCx positive for staph. He has been on Augmentin.

## 2024-06-07 NOTE — H&P ADULT - NSICDXPASTSURGICALHX_GEN_ALL_CORE_FT
PAST SURGICAL HISTORY:  History of cataract surgery BILAT    History of prostate surgery x 2    History of tonsillectomy

## 2024-06-07 NOTE — PRE-ANESTHESIA EVALUATION ADULT - NSANTHPMHFT_GEN_ALL_CORE
denies prior general anesthesia denies prior general anesthesia  type 2 mytonic dystrophy    a-flutter ablation

## 2024-06-08 ENCOUNTER — TRANSCRIPTION ENCOUNTER (OUTPATIENT)
Age: 69
End: 2024-06-08

## 2024-06-08 VITALS
OXYGEN SATURATION: 97 % | DIASTOLIC BLOOD PRESSURE: 56 MMHG | SYSTOLIC BLOOD PRESSURE: 111 MMHG | RESPIRATION RATE: 17 BRPM | HEART RATE: 60 BPM

## 2024-06-08 DIAGNOSIS — N40.1 BENIGN PROSTATIC HYPERPLASIA WITH LOWER URINARY TRACT SYMPTOMS: ICD-10-CM

## 2024-06-08 DIAGNOSIS — R00.1 BRADYCARDIA, UNSPECIFIED: ICD-10-CM

## 2024-06-08 LAB
ANION GAP SERPL CALC-SCNC: 11 MMOL/L — SIGNIFICANT CHANGE UP (ref 5–17)
BASOPHILS # BLD AUTO: 0.03 K/UL — SIGNIFICANT CHANGE UP (ref 0–0.2)
BASOPHILS NFR BLD AUTO: 0.5 % — SIGNIFICANT CHANGE UP (ref 0–2)
BUN SERPL-MCNC: 14 MG/DL — SIGNIFICANT CHANGE UP (ref 7–23)
CALCIUM SERPL-MCNC: 9.1 MG/DL — SIGNIFICANT CHANGE UP (ref 8.4–10.5)
CHLORIDE SERPL-SCNC: 107 MMOL/L — SIGNIFICANT CHANGE UP (ref 96–108)
CO2 SERPL-SCNC: 27 MMOL/L — SIGNIFICANT CHANGE UP (ref 22–31)
CREAT SERPL-MCNC: 1.04 MG/DL — SIGNIFICANT CHANGE UP (ref 0.5–1.3)
EGFR: 77 ML/MIN/1.73M2 — SIGNIFICANT CHANGE UP
EOSINOPHIL # BLD AUTO: 0.28 K/UL — SIGNIFICANT CHANGE UP (ref 0–0.5)
EOSINOPHIL NFR BLD AUTO: 4.3 % — SIGNIFICANT CHANGE UP (ref 0–6)
GLUCOSE SERPL-MCNC: 82 MG/DL — SIGNIFICANT CHANGE UP (ref 70–99)
HCT VFR BLD CALC: 36.5 % — LOW (ref 39–50)
HGB BLD-MCNC: 11.6 G/DL — LOW (ref 13–17)
IMM GRANULOCYTES NFR BLD AUTO: 0.3 % — SIGNIFICANT CHANGE UP (ref 0–0.9)
LYMPHOCYTES # BLD AUTO: 1.16 K/UL — SIGNIFICANT CHANGE UP (ref 1–3.3)
LYMPHOCYTES # BLD AUTO: 17.7 % — SIGNIFICANT CHANGE UP (ref 13–44)
MCHC RBC-ENTMCNC: 27.8 PG — SIGNIFICANT CHANGE UP (ref 27–34)
MCHC RBC-ENTMCNC: 31.8 GM/DL — LOW (ref 32–36)
MCV RBC AUTO: 87.5 FL — SIGNIFICANT CHANGE UP (ref 80–100)
MONOCYTES # BLD AUTO: 0.5 K/UL — SIGNIFICANT CHANGE UP (ref 0–0.9)
MONOCYTES NFR BLD AUTO: 7.6 % — SIGNIFICANT CHANGE UP (ref 2–14)
NEUTROPHILS # BLD AUTO: 4.58 K/UL — SIGNIFICANT CHANGE UP (ref 1.8–7.4)
NEUTROPHILS NFR BLD AUTO: 69.6 % — SIGNIFICANT CHANGE UP (ref 43–77)
NRBC # BLD: 0 /100 WBCS — SIGNIFICANT CHANGE UP (ref 0–0)
PLATELET # BLD AUTO: 193 K/UL — SIGNIFICANT CHANGE UP (ref 150–400)
POTASSIUM SERPL-MCNC: 4.7 MMOL/L — SIGNIFICANT CHANGE UP (ref 3.5–5.3)
POTASSIUM SERPL-SCNC: 4.7 MMOL/L — SIGNIFICANT CHANGE UP (ref 3.5–5.3)
RBC # BLD: 4.17 M/UL — LOW (ref 4.2–5.8)
RBC # FLD: 14.4 % — SIGNIFICANT CHANGE UP (ref 10.3–14.5)
SODIUM SERPL-SCNC: 145 MMOL/L — SIGNIFICANT CHANGE UP (ref 135–145)
WBC # BLD: 6.57 K/UL — SIGNIFICANT CHANGE UP (ref 3.8–10.5)
WBC # FLD AUTO: 6.57 K/UL — SIGNIFICANT CHANGE UP (ref 3.8–10.5)

## 2024-06-08 PROCEDURE — 88305 TISSUE EXAM BY PATHOLOGIST: CPT

## 2024-06-08 PROCEDURE — 85025 COMPLETE CBC W/AUTO DIFF WBC: CPT

## 2024-06-08 PROCEDURE — 80053 COMPREHEN METABOLIC PANEL: CPT

## 2024-06-08 PROCEDURE — 85027 COMPLETE CBC AUTOMATED: CPT

## 2024-06-08 PROCEDURE — 87077 CULTURE AEROBIC IDENTIFY: CPT

## 2024-06-08 PROCEDURE — 52224 CYSTOSCOPY AND TREATMENT: CPT

## 2024-06-08 PROCEDURE — C1782: CPT

## 2024-06-08 PROCEDURE — 87086 URINE CULTURE/COLONY COUNT: CPT

## 2024-06-08 PROCEDURE — 36415 COLL VENOUS BLD VENIPUNCTURE: CPT

## 2024-06-08 PROCEDURE — 84443 ASSAY THYROID STIM HORMONE: CPT

## 2024-06-08 PROCEDURE — 99232 SBSQ HOSP IP/OBS MODERATE 35: CPT

## 2024-06-08 PROCEDURE — 83735 ASSAY OF MAGNESIUM: CPT

## 2024-06-08 PROCEDURE — 80048 BASIC METABOLIC PNL TOTAL CA: CPT

## 2024-06-08 PROCEDURE — 71045 X-RAY EXAM CHEST 1 VIEW: CPT

## 2024-06-08 PROCEDURE — C1889: CPT

## 2024-06-08 PROCEDURE — 52649 PROSTATE LASER ENUCLEATION: CPT

## 2024-06-08 RX ORDER — BENZOCAINE AND MENTHOL 5; 1 G/100ML; G/100ML
1 LIQUID ORAL EVERY 6 HOURS
Refills: 0 | Status: DISCONTINUED | OUTPATIENT
Start: 2024-06-08 | End: 2024-06-08

## 2024-06-08 RX ADMIN — ENTECAVIR 0.5 MILLIGRAM(S): 0.5 TABLET ORAL at 16:13

## 2024-06-08 RX ADMIN — HEPARIN SODIUM 5000 UNIT(S): 5000 INJECTION INTRAVENOUS; SUBCUTANEOUS at 05:51

## 2024-06-08 RX ADMIN — BENZOCAINE AND MENTHOL 1 LOZENGE: 5; 1 LIQUID ORAL at 10:52

## 2024-06-08 NOTE — DISCHARGE NOTE PROVIDER - HOSPITAL COURSE
69 y.o. M patient s/p HoLEP 6/7, on tele for asymptomatic bradycardia intraop and post op with cards following.   CBI clamped overnight , initiated AVT this morning. If patient voids without difficulty and low PVR's will discharge home with or without cathter. VSS, OOB  tolerating regular diet

## 2024-06-08 NOTE — PROGRESS NOTE ADULT - SUBJECTIVE AND OBJECTIVE BOX
GIRMA SAEED  [id number]  06-08-24 @ 06:02      UROLOGY SERVICE: DAILY PROGRESS NOTE    Chief admitting complaint: BPH    No acute events overnight.  No N/V/D/F.  Pain moderately controlled.      LABS:   07 Jun 2024 18:10    148    |  109    |  14     ----------------------------<  79     3.8     |  28     |  0.87     Ca    9.3        07 Jun 2024 18:10  Mg     2.0       07 Jun 2024 18:10    TPro  6.3    /  Alb  3.7    /  TBili  1.2    /  DBili  x      /  AST  20     /  ALT  13     /  AlkPhos  85     07 Jun 2024 18:10                          12.8   8.99  )-----------( 224      ( 07 Jun 2024 18:10 )             39.8       I/O:  I&O's Summary    07 Jun 2024 07:01  -  08 Jun 2024 06:02  --------------------------------------------------------  IN: 1000 mL / OUT: 0 mL / NET: 1000 mL        VITALS:  Vital Signs Last 24 Hrs  T(C): 36.4 (06-08-24 @ 05:03), Max: 36.4 (06-07-24 @ 07:48)  T(F): 97.5 (06-08-24 @ 05:03), Max: 97.6 (06-07-24 @ 18:18)  HR: 48 (06-07-24 @ 23:30) (44 - 80)  BP: 91/50 (06-07-24 @ 23:30) (86/49 - 129/69)  BP(mean): 63 (06-07-24 @ 23:30) (63 - 94)  RR: 17 (06-07-24 @ 23:30) (13 - 25)  SpO2: 93% (06-07-24 @ 23:30) (93% - 100%)      PHYSICAL EXAM:    GEN: NAD, AAOx3  ABD: soft, NT/ND, no guarding or rigidity  : lozada draining yellow urine, CBI off  EXT: b/l LE with SCDS on  SKIN: No rashes, lesions, ulcerations

## 2024-06-08 NOTE — DIETITIAN INITIAL EVALUATION ADULT - OTHER CALCULATIONS
Estimated needs based on IBW as dosing wt 128lb/58kg <80% IBW (70%). Needs adjusted for age, BMI, malnutrition, status post OR, and clinical status.  Estimated needs based on IBW as dosing wt 128lb/58kg <80% IBW (70%). Needs adjusted for age, BMI, malnutrition, wound healing, status post OR, and clinical status.

## 2024-06-08 NOTE — DISCHARGE NOTE PROVIDER - NSDCMRMEDTOKEN_GEN_ALL_CORE_FT
amoxicillin-clavulanate 875 mg-125 mg oral tablet: 1 tab(s) orally 2 times a day  aspirin 81 mg oral capsule: 1 cap(s) orally 2 times a week  entecavir 0.5 mg oral tablet: 1 tab(s) orally once a day  lecithin 1200 mg oral capsule: 1 cap(s) orally once a day  magnesium glycinate 200 mg oral tablet: 2 tab(s) orally once a day  metoprolol succinate 25 mg oral capsule, extended release: 1 cap(s) orally once a day  tamsulosin 0.4 mg oral capsule: 1 cap(s) orally once a day (at bedtime)

## 2024-06-08 NOTE — DIETITIAN INITIAL EVALUATION ADULT - PERTINENT LABORATORY DATA
06-08    145  |  107  |  14  ----------------------------<  82  4.7   |  27  |  1.04    Ca    9.1      08 Jun 2024 05:30  Mg     2.0     06-07    TPro  6.3  /  Alb  3.7  /  TBili  1.2  /  DBili  x   /  AST  20  /  ALT  13  /  AlkPhos  85  06-07

## 2024-06-08 NOTE — DIETITIAN NUTRITION RISK NOTIFICATION - ADDITIONAL COMMENTS/DIETITIAN RECOMMENDATIONS
Pt reports decreased appetite and intake for months PTA in setting of life stressors and chronic stomach discomfort and constipation. Diet recall includes ~x2 meals/day (i.e., eggs + potatoes, chicken + vegetable/side), estimate pt meeting <75% nutrient needs. Reports UBW ~168lb x2-3 years ago, ~145lb x6 months ago; current dosing wt 128lb indicates 17lb/12% wt loss in 6 months - clinically significant. Nutrition-focused physical examination notable for severe muscle and fat wasting. Per ASPEN guidelines, pt meets criteria for severe chronic malnutrition.      Recommend add gentle bowel regimen pending no medical contraindications.

## 2024-06-08 NOTE — DISCHARGE NOTE PROVIDER - NSDCCPTREATMENT_GEN_ALL_CORE_FT
PRINCIPAL PROCEDURE  Procedure: Holmium laser enucleation of prostate (HoLEP)  Findings and Treatment:

## 2024-06-08 NOTE — DIETITIAN INITIAL EVALUATION ADULT - ADD RECOMMEND
1. Continue Regular diet, recommend add Ensure Plus High Protein (350kcal, 20g protein) x2/day to promote intake.   >>Encourage & monitor PO intake. [place] dietary preferences as able.   2. Monitor GI tolerance, weight trends, labs, & skin integrity.  3. Defer bowel and pain regimens to team.   >>Recommend add gentle bowel regimen pending no medical contraindications.   4. RD to remain available for diet education/intervention prn.

## 2024-06-08 NOTE — DIETITIAN INITIAL EVALUATION ADULT - PERTINENT MEDS FT
MEDICATIONS  (STANDING):  aspirin  chewable 81 milliGRAM(s) Oral daily  entecavir 0.5 milliGRAM(s) Oral daily  heparin   Injectable 5000 Unit(s) SubCutaneous every 8 hours  lactated ringers. 1000 milliLiter(s) (100 mL/Hr) IV Continuous <Continuous>    MEDICATIONS  (PRN):  acetaminophen     Tablet .. 1000 milliGRAM(s) Oral every 6 hours PRN Mild Pain (1 - 3), Moderate Pain (4 - 6)  lidocaine 2% Jelly 5 milliLiter(s) IntraUrethral three times a day PRN penile pain  phenazopyridine 100 milliGRAM(s) Oral every 8 hours PRN dysuria

## 2024-06-08 NOTE — DISCHARGE NOTE NURSING/CASE MANAGEMENT/SOCIAL WORK - NSDCPEFALRISK_GEN_ALL_CORE
For information on Fall & Injury Prevention, visit: [email or web address] OR  [email or web address] OR  https://www.cdc.gov/steadi/patient.html

## 2024-06-08 NOTE — PROGRESS NOTE ADULT - ASSESSMENT
69 y.o. M patient s/p HoLEP 6/7, on tele for asymptomatic bradycardia intraop and post op with cards following.

## 2024-06-08 NOTE — DIETITIAN INITIAL EVALUATION ADULT - OTHER INFO
69M with PMH of aflutter (status post ablation), muscular dystrophy, and BPH (acute urinary retention with chronic lozada) who admitted for elective holmium laser enucleation of the prostate (HoLEP) and transurethral resection of bladder tumor (TURBT). Now status post TURBT 6/7.    Pt seen on 8LA for assessment. Labs and medication orders reviewed. Ordered for lactated ringers. Electrolytes WNL. On Regular diet. Pt reports decreased appetite and intake for months PTA in setting of life stressors (wife recently passed away) and chronic stomach discomfort and constipation. Diet recall includes ~x2 meals/day (i.e., eggs + potatoes, chicken + vegetable/side), estimate pt meeting <75% nutrient needs. Reports UBW ~168lb x2-3 years ago, ~145lb x6 months ago; current dosing wt 128lb indicates 17lb/12% wt loss in 6 months - clinically significant. Nutrition-focused physical examination notable for severe muscle and fat wasting. Per ASPEN guidelines, pt meets criteria for severe chronic malnutrition - see nutrition risk notification. Pt denies nausea/vomiting/diarrhea at this time, chronic constipation (has trialed many laxatives/stool softeners PTA), reports last BM 6/6. Pt denies difficulty chewing/swallowing regular textures, notes emphasis on small bites and thorough chewing to avoid choking. Confirms no known food allergies. No Jainism/ethnic/cultural food preferences noted. No edema documented, sacral stage II pressure injury noted, Sunny score 16. RD reviewed formulary, pt amenable to Ensure. RD provided written and verbal nutrition education and provided inpatient and outpatient nutrition contact information. See nutrition recommendations. RD to remain available.

## 2024-06-08 NOTE — DISCHARGE NOTE NURSING/CASE MANAGEMENT/SOCIAL WORK - PATIENT PORTAL LINK FT
You can access the FollowMyHealth Patient Portal offered by [facility] by registering at the following website: [email or web address]. By joining [facility]’s FollowMyHealth portal, you will also be able to view your health information using other applications (apps) compatible with our system.

## 2024-06-08 NOTE — DIETITIAN INITIAL EVALUATION ADULT - EDUCATION DIETARY MODIFICATIONS
Educated on strategies to promote PO intake and energy/protein dense foods, discussed need for baseline calories to meet energy needs with additional protein to promote preservation of lean body mass. Answered pt and son questions regarding different oral nutrition supplements, recommended use between meals to maximize intake. Discussed nutrition to support bowel regularity, RD answered questions regarding laxatives vs fiber supplements, encouraged adequate hydration with increased dietary fiber. RD provided inpatient and outpatient nutrition contact information. Pt aware RD remains available for additional questions/concerns./teach back/(2) meets goals/outcomes/verbalization

## 2024-06-08 NOTE — PROVIDER CONTACT NOTE (OTHER) - SITUATION
RN did not wake pt up for vitals; RN waited for pt to void; once pt voided; temp was taken; BP was about to be taken when pt got super upset and said we don't let him sleep.

## 2024-06-08 NOTE — DIETITIAN NUTRITION RISK NOTIFICATION - TREATMENT: THE FOLLOWING DIET HAS BEEN RECOMMENDED
Continue Regular diet, recommend add Ensure Plus High Protein (350kcal, 20g protein) x2/day to promote intake.   >>Encourage & monitor PO intake. [place] dietary preferences as able.

## 2024-06-08 NOTE — DISCHARGE NOTE PROVIDER - NSDCFUADDINST_GEN_ALL_CORE_FT
General Discharge Instructions:  Please resume all regular home medications unless specifically advised not to take a particular medication. Also, please take any new medications as prescribed.  Please get plenty of rest, continue to ambulate several times per day, and drink adequate amounts of fluids. Avoid lifting weights greater than 10 lbs until you follow-up with your surgeon, who will instruct you further regarding activity restrictions.  Avoid driving or operating heavy machinery while taking pain medications.    GENERAL: It is common to have blood in your urine after your procedure. It may be pink or even red; and it is important to increase fluid intake to 2-3L of water per day to keep the urine as clear as possible. Please inform your doctor if you have a significant amount of clot in the urine or if you are unable to void at all. The urine may clear and then become bloody again especially as you are more physically active.    PAIN: You may take Tylenol (acetaminophen) 650-975mg and/or Motrin (ibuprofen) 400-600mg, both available over the counter, for pain every 6 hours as needed. Do not exceed 4000mg of Tylenol (acetaminophen) daily.     STOOL SOFTENERS: Do not allow yourself to become constipated as straining may cause bleeding. Take stool softeners or a laxative (ex. Miralax, Colace, Senokot, ExLax, etc), available over the counter, if needed.      CALL YOUR UROLOGIST IF: You have any bleeding that does not stop, inability to void >6 hours, fever over 101.0 F, chills, persistent nausea/vomiting,or if your pain is not controlled on your discharge pain medications.       General Discharge Instructions:  Cardiology recommendation: Please hold metoprolol bp medication because of symptomatic bradycardia. Please follow up with your cardiologist next week before taking.  Please resume all regular home medications unless specifically advised not to take a particular medication. Also, please take any new medications as prescribed.  Please get plenty of rest, continue to ambulate several times per day, and drink adequate amounts of fluids. Avoid lifting weights greater than 10 lbs until you follow-up with your surgeon, who will instruct you further regarding activity restrictions.  Avoid driving or operating heavy machinery while taking pain medications.    GENERAL: It is common to have blood in your urine after your procedure. It may be pink or even red; and it is important to increase fluid intake to 2-3L of water per day to keep the urine as clear as possible. Please inform your doctor if you have a significant amount of clot in the urine or if you are unable to void at all. The urine may clear and then become bloody again especially as you are more physically active.    PAIN: You may take Tylenol (acetaminophen) 650-975mg and/or Motrin (ibuprofen) 400-600mg, both available over the counter, for pain every 6 hours as needed. Do not exceed 4000mg of Tylenol (acetaminophen) daily.     STOOL SOFTENERS: Do not allow yourself to become constipated as straining may cause bleeding. Take stool softeners or a laxative (ex. Miralax, Colace, Senokot, ExLax, etc), available over the counter, if needed.      CALL YOUR UROLOGIST IF: You have any bleeding that does not stop, inability to void >6 hours, fever over 101.0 F, chills, persistent nausea/vomiting,or if your pain is not controlled on your discharge pain medications.

## 2024-06-08 NOTE — DISCHARGE NOTE PROVIDER - CARE PROVIDER_API CALL
Clif Pereira  Urology  [address]  [place] [address]  Phone: [phone]  Fax: [phone]  Established Patient  Follow Up Time:

## 2024-06-08 NOTE — DIETITIAN INITIAL EVALUATION ADULT - NUTRITIONGOAL OUTCOME1
Pt to consistently meet >75% nutrient needs. Reduce signs and symptoms of protein-calorie malnutrition

## 2024-06-08 NOTE — DIETITIAN INITIAL EVALUATION ADULT - LITERATURE/VIDEOS GIVEN
Tips for Increasing Calories, High Protein High Calorie Nutrition Therapy  [facility]

## 2024-06-10 ENCOUNTER — NON-APPOINTMENT (OUTPATIENT)
Age: 69
End: 2024-06-10

## 2024-06-10 PROBLEM — N50.89 OTHER SPECIFIED DISORDERS OF THE MALE GENITAL ORGANS: Chronic | Status: ACTIVE | Noted: 2024-06-06

## 2024-06-10 PROBLEM — Z87.898 PERSONAL HISTORY OF OTHER SPECIFIED CONDITIONS: Chronic | Status: ACTIVE | Noted: 2024-06-06

## 2024-06-10 PROBLEM — G71.11 MYOTONIC MUSCULAR DYSTROPHY: Chronic | Status: ACTIVE | Noted: 2024-06-06

## 2024-06-10 PROBLEM — K76.9 LIVER DISEASE, UNSPECIFIED: Chronic | Status: ACTIVE | Noted: 2024-06-06

## 2024-06-10 PROBLEM — I34.1 NONRHEUMATIC MITRAL (VALVE) PROLAPSE: Chronic | Status: ACTIVE | Noted: 2024-06-06

## 2024-06-10 PROBLEM — F41.9 ANXIETY DISORDER, UNSPECIFIED: Chronic | Status: ACTIVE | Noted: 2024-06-06

## 2024-06-10 PROBLEM — R06.02 SHORTNESS OF BREATH: Chronic | Status: ACTIVE | Noted: 2024-06-06

## 2024-06-10 PROBLEM — D49.4 NEOPLASM OF UNSPECIFIED BEHAVIOR OF BLADDER: Chronic | Status: ACTIVE | Noted: 2024-06-06

## 2024-06-10 PROBLEM — I49.3 VENTRICULAR PREMATURE DEPOLARIZATION: Chronic | Status: ACTIVE | Noted: 2024-06-06

## 2024-06-10 PROBLEM — K21.9 GASTRO-ESOPHAGEAL REFLUX DISEASE WITHOUT ESOPHAGITIS: Chronic | Status: ACTIVE | Noted: 2024-06-07

## 2024-06-10 PROBLEM — K64.9 UNSPECIFIED HEMORRHOIDS: Chronic | Status: ACTIVE | Noted: 2024-06-07

## 2024-06-10 PROBLEM — I48.92 UNSPECIFIED ATRIAL FLUTTER: Chronic | Status: ACTIVE | Noted: 2024-06-06

## 2024-06-10 PROBLEM — Z87.438 PERSONAL HISTORY OF OTHER DISEASES OF MALE GENITAL ORGANS: Chronic | Status: ACTIVE | Noted: 2024-06-06

## 2024-06-10 LAB
CULTURE RESULTS: ABNORMAL
SPECIMEN SOURCE: SIGNIFICANT CHANGE UP

## 2024-06-14 ENCOUNTER — NON-APPOINTMENT (OUTPATIENT)
Age: 69
End: 2024-06-14

## 2024-06-14 LAB — SURGICAL PATHOLOGY STUDY: SIGNIFICANT CHANGE UP

## 2024-06-20 DIAGNOSIS — I48.92 UNSPECIFIED ATRIAL FLUTTER: ICD-10-CM

## 2024-06-20 DIAGNOSIS — Z79.82 LONG TERM (CURRENT) USE OF ASPIRIN: ICD-10-CM

## 2024-06-20 DIAGNOSIS — N30.90 CYSTITIS, UNSPECIFIED WITHOUT HEMATURIA: ICD-10-CM

## 2024-06-20 DIAGNOSIS — T41.205A ADVERSE EFFECT OF UNSPECIFIED GENERAL ANESTHETICS, INITIAL ENCOUNTER: ICD-10-CM

## 2024-06-20 DIAGNOSIS — N40.1 BENIGN PROSTATIC HYPERPLASIA WITH LOWER URINARY TRACT SYMPTOMS: ICD-10-CM

## 2024-06-20 DIAGNOSIS — R00.1 BRADYCARDIA, UNSPECIFIED: ICD-10-CM

## 2024-06-20 DIAGNOSIS — K21.9 GASTRO-ESOPHAGEAL REFLUX DISEASE WITHOUT ESOPHAGITIS: ICD-10-CM

## 2024-06-20 DIAGNOSIS — E43 UNSPECIFIED SEVERE PROTEIN-CALORIE MALNUTRITION: ICD-10-CM

## 2024-06-20 DIAGNOSIS — I48.0 PAROXYSMAL ATRIAL FIBRILLATION: ICD-10-CM

## 2024-06-20 DIAGNOSIS — I49.3 VENTRICULAR PREMATURE DEPOLARIZATION: ICD-10-CM

## 2024-06-20 DIAGNOSIS — I34.1 NONRHEUMATIC MITRAL (VALVE) PROLAPSE: ICD-10-CM

## 2024-06-20 DIAGNOSIS — F41.8 OTHER SPECIFIED ANXIETY DISORDERS: ICD-10-CM

## 2024-06-20 DIAGNOSIS — G71.11 MYOTONIC MUSCULAR DYSTROPHY: ICD-10-CM

## 2024-06-20 DIAGNOSIS — I97.791 OTHER INTRAOPERATIVE CARDIAC FUNCTIONAL DISTURBANCES DURING OTHER SURGERY: ICD-10-CM

## 2024-06-20 DIAGNOSIS — R33.8 OTHER RETENTION OF URINE: ICD-10-CM

## 2024-06-21 ENCOUNTER — APPOINTMENT (OUTPATIENT)
Dept: UROLOGY | Facility: CLINIC | Age: 69
End: 2024-06-21
Payer: MEDICARE

## 2024-06-21 VITALS
HEIGHT: 73 IN | SYSTOLIC BLOOD PRESSURE: 146 MMHG | DIASTOLIC BLOOD PRESSURE: 78 MMHG | TEMPERATURE: 98.2 F | WEIGHT: 123 LBS | BODY MASS INDEX: 16.3 KG/M2 | HEART RATE: 86 BPM

## 2024-06-21 VITALS
DIASTOLIC BLOOD PRESSURE: 56 MMHG | SYSTOLIC BLOOD PRESSURE: 87 MMHG | HEART RATE: 86 BPM | BODY MASS INDEX: 16.3 KG/M2 | TEMPERATURE: 98.2 F | HEIGHT: 73 IN | WEIGHT: 123 LBS

## 2024-06-21 DIAGNOSIS — D49.4 NEOPLASM OF UNSPECIFIED BEHAVIOR OF BLADDER: ICD-10-CM

## 2024-06-21 DIAGNOSIS — R35.1 BENIGN PROSTATIC HYPERPLASIA WITH LOWER URINARY TRACT SYMPMS: ICD-10-CM

## 2024-06-21 DIAGNOSIS — R33.8 OTHER RETENTION OF URINE: ICD-10-CM

## 2024-06-21 DIAGNOSIS — N40.1 BENIGN PROSTATIC HYPERPLASIA WITH LOWER URINARY TRACT SYMPMS: ICD-10-CM

## 2024-06-21 PROCEDURE — 99024 POSTOP FOLLOW-UP VISIT: CPT

## 2024-06-21 NOTE — HISTORY OF PRESENT ILLNESS
[FreeTextEntry1] : 5/24/24: 69 year old patient referred by Dr. Barragan for urinary retention and bladder tumor. 119 gram prostate.   He has hx of 2 prior prostate procedures: 2011 greenlight and 2018 microwave. He has chronic LUTS, was taking flomax intermittently, difficulty taking due to low blood pressure baseline. Recently went into urinary retention, failed void trials. Catheter last changed 5/6/24. Currently taking augmentin for positive urine culture, was having bladder spasms, less severe since starting antibiotic.  He has had recurrent gross hematuria for many years. CT urogram negative. 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  Chronic LUTS  Has elevated PSA of 13, MRI negative.   6/7/24: S/P HoLEP and bladder biopsy. Passed void trial in hospital  Pathology with 71 grams benign prostate tissue. Bladder biopsy with acute and chronic inflammation.   6/21/24: Doing well. Voiding with strong stream, complete emptying, no leakage, no hematuria.  PVR 71 cc

## 2024-06-21 NOTE — ASSESSMENT
[FreeTextEntry1] : 69 year old man with history of myotonic muscular dysrtrophy, narrow airway with bowed vocal chords, 119 cc prostate with urinary retention and a 5 mm bladder tumor. S/P HoLEP and TURBT 6/7/24. Passed void trial POD in hospital. Pathology with 71 cc benign prostate tissue. TURBT negative. Doing well with strong stream, complete emptying, no leakage or hematuria.  - F/U in 3 months for UA, IPSS, PVR, PSA

## 2024-10-30 ENCOUNTER — APPOINTMENT (OUTPATIENT)
Dept: UROLOGY | Facility: CLINIC | Age: 69
End: 2024-10-30
Payer: MEDICARE

## 2024-10-30 VITALS
HEIGHT: 73 IN | DIASTOLIC BLOOD PRESSURE: 51 MMHG | WEIGHT: 123 LBS | SYSTOLIC BLOOD PRESSURE: 86 MMHG | BODY MASS INDEX: 16.3 KG/M2 | TEMPERATURE: 97.9 F | HEART RATE: 73 BPM

## 2024-10-30 DIAGNOSIS — N40.1 BENIGN PROSTATIC HYPERPLASIA WITH LOWER URINARY TRACT SYMPMS: ICD-10-CM

## 2024-10-30 DIAGNOSIS — R35.1 BENIGN PROSTATIC HYPERPLASIA WITH LOWER URINARY TRACT SYMPMS: ICD-10-CM

## 2024-10-30 PROCEDURE — G2211 COMPLEX E/M VISIT ADD ON: CPT

## 2024-10-30 PROCEDURE — 51798 US URINE CAPACITY MEASURE: CPT

## 2024-10-30 PROCEDURE — 99214 OFFICE O/P EST MOD 30 MIN: CPT

## 2024-10-31 LAB
APPEARANCE: CLEAR
BACTERIA: NEGATIVE /HPF
BILIRUBIN URINE: NEGATIVE
BLOOD URINE: NEGATIVE
CAST: 0 /LPF
COLOR: YELLOW
EPITHELIAL CELLS: 1 /HPF
GLUCOSE QUALITATIVE U: NEGATIVE MG/DL
KETONES URINE: NEGATIVE MG/DL
LEUKOCYTE ESTERASE URINE: ABNORMAL
MICROSCOPIC-UA: NORMAL
NITRITE URINE: NEGATIVE
PH URINE: 7.5
PROTEIN URINE: NEGATIVE MG/DL
RED BLOOD CELLS URINE: 1 /HPF
SPECIFIC GRAVITY URINE: 1.01
UROBILINOGEN URINE: 0.2 MG/DL
WHITE BLOOD CELLS URINE: 71 /HPF

## 2024-11-05 LAB
BACTERIA UR CULT: NORMAL
PSA SERPL-MCNC: 1.72 NG/ML

## 2025-01-06 ENCOUNTER — APPOINTMENT (OUTPATIENT)
Dept: UROLOGY | Facility: CLINIC | Age: 70
End: 2025-01-06

## 2025-06-18 NOTE — ED ADULT NURSE NOTE - ATTEMPT TO OOB
Duplicate request.     levothyroxine 137 MCG tablet 90 tablet 0 5/20/2025 --    Sig - Route: Take 1 tablet by mouth daily. - Oral    Sent to pharmacy as: Levothyroxine Sodium 137 MCG Oral Tablet    Class: Eprescribe    E-Prescribing Status: Receipt confirmed by pharmacy (5/20/2025  3:50 PM CDT)      
no

## (undated) DEVICE — UROVAC

## (undated) DEVICE — FOLEY CATH 3-WAY 20FR 30CC LATEX LUBRICATH

## (undated) DEVICE — GLV 7.5 PROTEXIS (WHITE)

## (undated) DEVICE — DRAINAGE BAG URINARY 4L

## (undated) DEVICE — TAPE SILK 3"

## (undated) DEVICE — CONTAINER TISSUE COLLECTING DISP

## (undated) DEVICE — ELCTR CUTTING 24/26FR

## (undated) DEVICE — ELCTR BIVAP BIPOLAR VAPORIZATION 26FR

## (undated) DEVICE — SOL IRR BAG NS 0.9% 3000ML

## (undated) DEVICE — TUBING TUR 2 PRONG

## (undated) DEVICE — MEDELA OVERFLOW FILTER DISPOSABLE

## (undated) DEVICE — TUBING SET FOR SUCTION PUMP

## (undated) DEVICE — TUBING RANGER FLUID IRRIGATION SET DISP

## (undated) DEVICE — PACK CYSTO

## (undated) DEVICE — POSITIONER FOAM EGG CRATE ULNAR 2PCS (PINK)

## (undated) DEVICE — FOLEY CATH 3-WAY 22FR 30CC LATEX HEMATURIA COUDE